# Patient Record
Sex: MALE | Race: BLACK OR AFRICAN AMERICAN | Employment: FULL TIME | URBAN - METROPOLITAN AREA
[De-identification: names, ages, dates, MRNs, and addresses within clinical notes are randomized per-mention and may not be internally consistent; named-entity substitution may affect disease eponyms.]

---

## 2017-06-11 ENCOUNTER — APPOINTMENT (OUTPATIENT)
Dept: GENERAL RADIOLOGY | Age: 52
End: 2017-06-11
Attending: EMERGENCY MEDICINE
Payer: SELF-PAY

## 2017-06-11 ENCOUNTER — HOSPITAL ENCOUNTER (EMERGENCY)
Age: 52
Discharge: HOME | End: 2017-06-11
Attending: EMERGENCY MEDICINE
Payer: SELF-PAY

## 2017-06-11 VITALS
DIASTOLIC BLOOD PRESSURE: 96 MMHG | RESPIRATION RATE: 18 BRPM | HEIGHT: 68 IN | OXYGEN SATURATION: 100 % | TEMPERATURE: 98 F | BODY MASS INDEX: 30.71 KG/M2 | HEART RATE: 66 BPM | SYSTOLIC BLOOD PRESSURE: 152 MMHG | WEIGHT: 202.6 LBS

## 2017-06-11 DIAGNOSIS — S59.902A ELBOW INJURY, LEFT, INITIAL ENCOUNTER: Primary | ICD-10-CM

## 2017-06-11 DIAGNOSIS — M25.422 ELBOW JOINT EFFUSION, LEFT: ICD-10-CM

## 2017-06-11 PROCEDURE — 99283 EMERGENCY DEPT VISIT LOW MDM: CPT

## 2017-06-11 PROCEDURE — 73080 X-RAY EXAM OF ELBOW: CPT

## 2017-06-11 PROCEDURE — A4565 SLINGS: HCPCS

## 2017-06-11 PROCEDURE — 74011250637 HC RX REV CODE- 250/637: Performed by: EMERGENCY MEDICINE

## 2017-06-11 PROCEDURE — L3670 SO ACRO/CLAV CAN WEB PRE OTS: HCPCS

## 2017-06-11 RX ORDER — HYDROCODONE BITARTRATE AND ACETAMINOPHEN 5; 325 MG/1; MG/1
1 TABLET ORAL
Status: COMPLETED | OUTPATIENT
Start: 2017-06-11 | End: 2017-06-11

## 2017-06-11 RX ORDER — ACETAMINOPHEN 325 MG/1
1000 TABLET ORAL 2 TIMES DAILY
COMMUNITY
End: 2017-12-18

## 2017-06-11 RX ORDER — HYDROCODONE BITARTRATE AND ACETAMINOPHEN 5; 325 MG/1; MG/1
1 TABLET ORAL
Qty: 20 TAB | Refills: 0 | Status: SHIPPED | OUTPATIENT
Start: 2017-06-11 | End: 2017-12-18

## 2017-06-11 RX ADMIN — HYDROCODONE BITARTRATE AND ACETAMINOPHEN 1 TABLET: 5; 325 TABLET ORAL at 05:01

## 2017-06-11 NOTE — ED PROVIDER NOTES
HPI Comments: Piedad Tejada is a 54 y.o. M with PMHx significant for HTN / Sepsis / Ulcers who presents ambulatory to ED [facility] ED c/o left forearm pain s/p falling backwards and bracing fall with left arm at work yesterday. Pt states that pain is worse with movement. He notes right hand dominance. Pt specifically denies any LOC, nausea or vomiting. There are no other complaints, changes, or physical findings at this time. The history is provided by the patient. Past Medical History:   Diagnosis Date    Gastrointestinal disorder     Hypertension     Neurological disorder     migraines    Other ill-defined conditions     Sepsis       Past Surgical History:   Procedure Laterality Date    HX HEENT      teeth pulled    HX OTHER SURGICAL      cyst head removed    NE EGD TRANSORAL BIOPSY SINGLE/MULTIPLE  1/9/2014              History reviewed. No pertinent family history. Social History     Social History    Marital status: SINGLE     Spouse name: N/A    Number of children: N/A    Years of education: N/A     Occupational History    Not on file. Social History Main Topics    Smoking status: Current Every Day Smoker     Packs/day: 1.00    Smokeless tobacco: Not on file    Alcohol use No    Drug use: No    Sexual activity: Not on file     Other Topics Concern    Not on file     Social History Narrative         ALLERGIES: Review of patient's allergies indicates no known allergies. Review of Systems   Constitutional: Negative. Negative for chills and fever. HENT: Negative. Negative for congestion, rhinorrhea and sinus pressure. Eyes: Negative. Negative for discharge and redness. Respiratory: Negative. Negative for chest tightness and shortness of breath. Cardiovascular: Negative. Negative for chest pain. Gastrointestinal: Negative. Negative for abdominal pain, blood in stool, nausea and vomiting. Endocrine: Negative. Genitourinary: Negative.   Negative for flank pain and hematuria. Musculoskeletal: Negative for back pain. Positive for left forearm pain   Skin: Negative. Negative for rash. Neurological: Negative. Negative for dizziness, seizures, syncope, weakness, numbness and headaches. Hematological: Negative. All other systems reviewed and are negative. Vitals:    06/11/17 0401 06/11/17 0449   BP: (!) 144/118 (!) 152/96   Pulse: 66    Resp: 18    Temp: 98 °F (36.7 °C)    SpO2: 100%    Weight: 91.9 kg (202 lb 9.6 oz)    Height: 5' 8\" (1.727 m)             Physical Exam   Constitutional: He is oriented to person, place, and time. He appears well-developed and well-nourished. No distress. HENT:   Head: Normocephalic and atraumatic. Nose: Nose normal.   Mouth/Throat: No oropharyngeal exudate. Eyes: Conjunctivae and EOM are normal. Pupils are equal, round, and reactive to light. No scleral icterus. Neck: Normal range of motion. Neck supple. No JVD present. No thyromegaly present. Cardiovascular: Normal rate, regular rhythm, normal heart sounds, intact distal pulses and normal pulses. PMI is not displaced. Exam reveals no gallop and no friction rub. No murmur heard. Pulmonary/Chest: Effort normal and breath sounds normal. No stridor. No respiratory distress. He has no decreased breath sounds. He has no wheezes. He has no rhonchi. He has no rales. He exhibits no tenderness. Abdominal: Soft. Normal aorta and bowel sounds are normal. He exhibits no distension, no abdominal bruit and no mass. There is no hepatosplenomegaly. There is no tenderness. There is no rebound, no guarding and no CVA tenderness. No hernia. Musculoskeletal:        Left shoulder: Normal.        Left elbow: He exhibits decreased range of motion. He exhibits no swelling, no effusion, no deformity and no laceration. Tenderness found. Olecranon process tenderness noted. No radial head, no medial epicondyle and no lateral epicondyle tenderness noted.         Left wrist: Normal. Neurological: He is alert and oriented to person, place, and time. He has normal reflexes. He displays no atrophy and no tremor. No cranial nerve deficit or sensory deficit. He exhibits normal muscle tone. He displays no seizure activity. Coordination normal. GCS eye subscore is 4. GCS verbal subscore is 5. GCS motor subscore is 6. Reflex Scores:       Patellar reflexes are 2+ on the right side and 2+ on the left side. Skin: Skin is warm. No rash noted. He is not diaphoretic. No erythema. Nursing note and vitals reviewed. MDM  Number of Diagnoses or Management Options  Elbow injury, left, initial encounter:   Elbow joint effusion, left:   Diagnosis management comments: DDx: Elbow fracture, Elbow dislocation, Elbow sprain. Impression/Plan: Pt fell while at work on an outstretched arm. Clinically has tenderness on olecranon but no obvious effusion. Neurovascularly intact. Will obtain x-ray to obtain final disposition. Amount and/or Complexity of Data Reviewed  Tests in the radiology section of CPT®: ordered and reviewed  Obtain history from someone other than the patient: yes  Review and summarize past medical records: yes  Independent visualization of images, tracings, or specimens: yes    Risk of Complications, Morbidity, and/or Mortality  Presenting problems: moderate  Diagnostic procedures: moderate  Management options: moderate    Patient Progress  Patient progress: stable    ED Course       Procedures    Patient Vitals for the past 12 hrs:   Temp Pulse Resp BP SpO2   06/11/17 0449 - - - (!) 152/96 -   06/11/17 0401 98 °F (36.7 °C) 66 18 (!) 144/118 100 %     IMAGING RESULTS:  XR ELBOW LT MIN 3 V   Final Result   EXAM: XR ELBOW LT MIN 3 V     INDICATION: left elbow pain. Injury at work.     COMPARISON: None.     FINDINGS: Three views of the left elbow demonstrate a joint effusion. . No  visible fracture line. Joint spaces are within normal limits.  Bone  mineralization is within normal limits.     IMPRESSION  IMPRESSION:      Joint effusion. No visible fracture line. Consider repeat in one week if there  is still pain. MEDICATIONS GIVEN:  Medications   HYDROcodone-acetaminophen (NORCO) 5-325 mg per tablet 1 Tab (1 Tab Oral Given 6/11/17 0501)       IMPRESSION:  1. Elbow injury, left, initial encounter    2. Elbow joint effusion, left        PLAN:  1. Discharge Medication List as of 6/11/2017  6:03 AM      START taking these medications    Details   HYDROcodone-acetaminophen (NORCO) 5-325 mg per tablet Take 1 Tab by mouth every four (4) hours as needed for Pain. Max Daily Amount: 6 Tabs., Print, Disp-20 Tab, R-0         CONTINUE these medications which have NOT CHANGED    Details   acetaminophen (TYLENOL) 325 mg tablet Take 1,000 mg by mouth two (2) times a day., Historical Med           2. Follow-up Information     Follow up With Details Comments [address] [facility], DO Call in 2 days  [address] [facility] [address] [phone]      [facility] EMERGENCY DEPT  If symptoms worsen [address] [place] [address]  [phone]        Return to ED if worse     DISCHARGE NOTE:  6:25 AM  The patient's results have been reviewed with family and/or caregiver. They verbally convey their understanding and agreement of the patient's signs, symptoms, diagnosis, treatment, and prognosis. They additionally agree to follow up as recommended in the discharge instructions or to return to the Emergency Room should the patient's condition change prior to their follow-up appointment. The family and/or caregiver verbally agrees with the care-plan and all of their questions have been answered. The discharge instructions have also been provided to the them along with educational information regarding the patient's diagnosis and a list of reasons why the patient would want to return to the ER prior to their follow-up appointment should their condition change.   Written by Jose D Gerber Evans Fuentes, ED Scribe, as dictated by Dede Mehta MD.        Attestations: This note is prepared by Lisa Villarreal, acting as Scribe for Dede Mehta MD.    Dede Mehta MD: The scribe's documentation has been prepared under my direction and personally reviewed by me in its entirety. I confirm that the note above accurately reflects all work, treatment, procedures, and medical decision making performed by me.

## 2017-06-11 NOTE — LETTER
[address] 
[facility] EMERGENCY DEPT 
[address] [place] [address] [address] [phone] 
[phone] Work/School Note Date: 6/11/2017 To Whom It May concern: 
 
Blaine Paz was seen and treated today in the emergency room by the following provider(s): 
Attending Provider: Denisse Gonzalez MD.   
 
Blaine Paz No work till 6/12/17, limited use left arm till 6/18/17 Sincerely, Denisse Gonzalez MD

## 2017-06-11 NOTE — ED NOTES
Dr. Bassam Joseph gave and reviewed discharge instructions with the patient. The patient verbalized understanding. The patient was given opportunity for questions. Patient discharged in stable condition to the waiting room with male visitor.

## 2017-06-11 NOTE — DISCHARGE INSTRUCTIONS
[employer] Activation    Thank you for requesting access to [employer]. Please follow the instructions below to securely access and download your online medical record. [employer] allows you to send messages to your doctor, view your test results, renew your prescriptions, schedule appointments, and more. How Do I Sign Up? 1. In your internet browser, go to [email or web address]  2. Click on the First Time User? Click Here link in the Sign In box. You will be redirect to the New Member Sign Up page. 3. Enter your [employer] Access Code exactly as it appears below. You will not need to use this code after youve completed the sign-up process. If you do not sign up before the expiration date, you must request a new code. [employer] Access Code: [email or web address]  Expires: 2017  3:45 AM (This is the date your [employer] access code will )    4. Enter the last four digits of your Social Security Number (xxxx) and Date of Birth (mm/dd/yyyy) as indicated and click Submit. You will be taken to the next sign-up page. 5. Create a [employer] ID. This will be your [employer] login ID and cannot be changed, so think of one that is secure and easy to remember. 6. Create a [employer] password. You can change your password at any time. 7. Enter your Password Reset Question and Answer. This can be used at a later time if you forget your password. 8. Enter your e-mail address. You will receive e-mail notification when new information is available in [address]. 9. Click Sign Up. You can now view and download portions of your medical record. 10. Click the Download Summary menu link to download a portable copy of your medical information. Additional Information    If you have questions, please visit the Frequently Asked Questions section of the [employer] website at [email or web address] Remember, [employer] is NOT to be used for urgent needs. For medical emergencies, dial 911.

## 2017-12-18 ENCOUNTER — HOSPITAL ENCOUNTER (EMERGENCY)
Age: 52
Discharge: HOME | End: 2017-12-18
Attending: EMERGENCY MEDICINE
Payer: SELF-PAY

## 2017-12-18 ENCOUNTER — APPOINTMENT (OUTPATIENT)
Dept: CT IMAGING | Age: 52
End: 2017-12-18
Attending: EMERGENCY MEDICINE
Payer: SELF-PAY

## 2017-12-18 ENCOUNTER — APPOINTMENT (OUTPATIENT)
Dept: GENERAL RADIOLOGY | Age: 52
End: 2017-12-18
Attending: EMERGENCY MEDICINE
Payer: SELF-PAY

## 2017-12-18 VITALS
BODY MASS INDEX: 29.84 KG/M2 | RESPIRATION RATE: 16 BRPM | TEMPERATURE: 98.3 F | HEIGHT: 68 IN | OXYGEN SATURATION: 93 % | DIASTOLIC BLOOD PRESSURE: 92 MMHG | SYSTOLIC BLOOD PRESSURE: 123 MMHG | HEART RATE: 82 BPM | WEIGHT: 196.87 LBS

## 2017-12-18 DIAGNOSIS — K85.90 ACUTE PANCREATITIS, UNSPECIFIED COMPLICATION STATUS, UNSPECIFIED PANCREATITIS TYPE: ICD-10-CM

## 2017-12-18 DIAGNOSIS — K22.6 MALLORY-WEISS TEAR: ICD-10-CM

## 2017-12-18 DIAGNOSIS — R55 FAINTING SPELL: ICD-10-CM

## 2017-12-18 DIAGNOSIS — R10.9 ACUTE ABDOMINAL PAIN: Primary | ICD-10-CM

## 2017-12-18 LAB
ABO + RH BLD: NORMAL
ALBUMIN SERPL-MCNC: 3.9 G/DL (ref 3.5–5)
ALBUMIN/GLOB SERPL: 1 {RATIO} (ref 1.1–2.2)
ALP SERPL-CCNC: 53 U/L (ref 45–117)
ALT SERPL-CCNC: 22 U/L (ref 12–78)
ANION GAP SERPL CALC-SCNC: 10 MMOL/L (ref 5–15)
APPEARANCE UR: CLEAR
APTT PPP: 29.1 SEC (ref 22.1–32.5)
AST SERPL-CCNC: 23 U/L (ref 15–37)
BASOPHILS # BLD: 0 K/UL (ref 0–0.1)
BASOPHILS NFR BLD: 0 % (ref 0–1)
BILIRUB SERPL-MCNC: 0.6 MG/DL (ref 0.2–1)
BILIRUB UR QL CFM: NEGATIVE
BLOOD GROUP ANTIBODIES SERPL: NORMAL
BUN SERPL-MCNC: 10 MG/DL (ref 6–20)
BUN/CREAT SERPL: 8 (ref 12–20)
CALCIUM SERPL-MCNC: 9 MG/DL (ref 8.5–10.1)
CHLORIDE SERPL-SCNC: 108 MMOL/L (ref 97–108)
CO2 SERPL-SCNC: 23 MMOL/L (ref 21–32)
COLOR UR: NORMAL
CREAT SERPL-MCNC: 1.19 MG/DL (ref 0.7–1.3)
EOSINOPHIL # BLD: 0.1 K/UL (ref 0–0.4)
EOSINOPHIL NFR BLD: 2 % (ref 0–7)
ERYTHROCYTE [DISTWIDTH] IN BLOOD BY AUTOMATED COUNT: 12.7 % (ref 11.5–14.5)
GLOBULIN SER CALC-MCNC: 3.9 G/DL (ref 2–4)
GLUCOSE SERPL-MCNC: 84 MG/DL (ref 65–100)
GLUCOSE UR STRIP.AUTO-MCNC: NEGATIVE MG/DL
HCT VFR BLD AUTO: 42.5 % (ref 36.6–50.3)
HEMOCCULT STL QL: NEGATIVE
HGB BLD-MCNC: 13.9 G/DL (ref 12.1–17)
HGB UR QL STRIP: NEGATIVE
INR PPP: 1 (ref 0.9–1.1)
KETONES UR QL STRIP.AUTO: NEGATIVE MG/DL
LACTATE SERPL-SCNC: 0.9 MMOL/L (ref 0.4–2)
LEUKOCYTE ESTERASE UR QL STRIP.AUTO: NEGATIVE
LIPASE SERPL-CCNC: 1141 U/L (ref 73–393)
LYMPHOCYTES # BLD: 2.4 K/UL (ref 0.8–3.5)
LYMPHOCYTES NFR BLD: 28 % (ref 12–49)
MAGNESIUM SERPL-MCNC: 1.8 MG/DL (ref 1.6–2.4)
MCH RBC QN AUTO: 30.7 PG (ref 26–34)
MCHC RBC AUTO-ENTMCNC: 32.7 G/DL (ref 30–36.5)
MCV RBC AUTO: 93.8 FL (ref 80–99)
MONOCYTES # BLD: 0.6 K/UL (ref 0–1)
MONOCYTES NFR BLD: 7 % (ref 5–13)
NEUTS SEG # BLD: 5.5 K/UL (ref 1.8–8)
NEUTS SEG NFR BLD: 63 % (ref 32–75)
NITRITE UR QL STRIP.AUTO: NEGATIVE
PH UR STRIP: 5.5 [PH] (ref 5–8)
PLATELET # BLD AUTO: 242 K/UL (ref 150–400)
POTASSIUM SERPL-SCNC: 4 MMOL/L (ref 3.5–5.1)
PROT SERPL-MCNC: 7.8 G/DL (ref 6.4–8.2)
PROT UR STRIP-MCNC: NEGATIVE MG/DL
PROTHROMBIN TIME: 10.5 SEC (ref 9–11.1)
RBC # BLD AUTO: 4.53 M/UL (ref 4.1–5.7)
SODIUM SERPL-SCNC: 141 MMOL/L (ref 136–145)
SP GR UR REFRACTOMETRY: 1.02 (ref 1–1.03)
SPECIMEN EXP DATE BLD: NORMAL
THERAPEUTIC RANGE,PTTT: NORMAL SECS (ref 58–77)
TROPONIN I SERPL-MCNC: <0.04 NG/ML
UROBILINOGEN UR QL STRIP.AUTO: 0.2 EU/DL (ref 0.2–1)
WBC # BLD AUTO: 8.7 K/UL (ref 4.1–11.1)

## 2017-12-18 PROCEDURE — 74011000250 HC RX REV CODE- 250: Performed by: EMERGENCY MEDICINE

## 2017-12-18 PROCEDURE — 96375 TX/PRO/DX INJ NEW DRUG ADDON: CPT

## 2017-12-18 PROCEDURE — 83605 ASSAY OF LACTIC ACID: CPT | Performed by: EMERGENCY MEDICINE

## 2017-12-18 PROCEDURE — 85025 COMPLETE CBC W/AUTO DIFF WBC: CPT | Performed by: EMERGENCY MEDICINE

## 2017-12-18 PROCEDURE — 85610 PROTHROMBIN TIME: CPT | Performed by: EMERGENCY MEDICINE

## 2017-12-18 PROCEDURE — 96361 HYDRATE IV INFUSION ADD-ON: CPT

## 2017-12-18 PROCEDURE — 82272 OCCULT BLD FECES 1-3 TESTS: CPT | Performed by: EMERGENCY MEDICINE

## 2017-12-18 PROCEDURE — 36415 COLL VENOUS BLD VENIPUNCTURE: CPT | Performed by: EMERGENCY MEDICINE

## 2017-12-18 PROCEDURE — 80053 COMPREHEN METABOLIC PANEL: CPT | Performed by: EMERGENCY MEDICINE

## 2017-12-18 PROCEDURE — 99284 EMERGENCY DEPT VISIT MOD MDM: CPT

## 2017-12-18 PROCEDURE — 93005 ELECTROCARDIOGRAM TRACING: CPT

## 2017-12-18 PROCEDURE — 74011636320 HC RX REV CODE- 636/320: Performed by: EMERGENCY MEDICINE

## 2017-12-18 PROCEDURE — C9113 INJ PANTOPRAZOLE SODIUM, VIA: HCPCS | Performed by: EMERGENCY MEDICINE

## 2017-12-18 PROCEDURE — 74011250636 HC RX REV CODE- 250/636: Performed by: EMERGENCY MEDICINE

## 2017-12-18 PROCEDURE — 86900 BLOOD TYPING SEROLOGIC ABO: CPT | Performed by: EMERGENCY MEDICINE

## 2017-12-18 PROCEDURE — 83735 ASSAY OF MAGNESIUM: CPT | Performed by: EMERGENCY MEDICINE

## 2017-12-18 PROCEDURE — 96374 THER/PROPH/DIAG INJ IV PUSH: CPT

## 2017-12-18 PROCEDURE — 85730 THROMBOPLASTIN TIME PARTIAL: CPT | Performed by: EMERGENCY MEDICINE

## 2017-12-18 PROCEDURE — 71010 XR CHEST PORT: CPT

## 2017-12-18 PROCEDURE — 83690 ASSAY OF LIPASE: CPT | Performed by: EMERGENCY MEDICINE

## 2017-12-18 PROCEDURE — 84484 ASSAY OF TROPONIN QUANT: CPT | Performed by: EMERGENCY MEDICINE

## 2017-12-18 PROCEDURE — 81003 URINALYSIS AUTO W/O SCOPE: CPT | Performed by: EMERGENCY MEDICINE

## 2017-12-18 PROCEDURE — 74177 CT ABD & PELVIS W/CONTRAST: CPT

## 2017-12-18 RX ORDER — OMEPRAZOLE 20 MG/1
20 TABLET, DELAYED RELEASE ORAL DAILY
Qty: 30 TAB | Refills: 0 | Status: SHIPPED | OUTPATIENT
Start: 2017-12-18

## 2017-12-18 RX ORDER — SODIUM CHLORIDE 9 MG/ML
50 INJECTION, SOLUTION INTRAVENOUS
Status: COMPLETED | OUTPATIENT
Start: 2017-12-18 | End: 2017-12-18

## 2017-12-18 RX ORDER — PROMETHAZINE HYDROCHLORIDE 25 MG/1
25 TABLET ORAL
Qty: 12 TAB | Refills: 0 | Status: SHIPPED | OUTPATIENT
Start: 2017-12-18 | End: 2019-01-13

## 2017-12-18 RX ORDER — ONDANSETRON 2 MG/ML
4 INJECTION INTRAMUSCULAR; INTRAVENOUS
Status: COMPLETED | OUTPATIENT
Start: 2017-12-18 | End: 2017-12-18

## 2017-12-18 RX ORDER — SODIUM CHLORIDE 0.9 % (FLUSH) 0.9 %
10 SYRINGE (ML) INJECTION
Status: COMPLETED | OUTPATIENT
Start: 2017-12-18 | End: 2017-12-18

## 2017-12-18 RX ORDER — HYDROCODONE BITARTRATE AND ACETAMINOPHEN 5; 325 MG/1; MG/1
1 TABLET ORAL
Qty: 20 TAB | Refills: 0 | Status: SHIPPED | OUTPATIENT
Start: 2017-12-18 | End: 2019-01-13

## 2017-12-18 RX ADMIN — SODIUM CHLORIDE 50 ML/HR: 900 INJECTION, SOLUTION INTRAVENOUS at 16:52

## 2017-12-18 RX ADMIN — SODIUM CHLORIDE 1000 ML: 900 INJECTION, SOLUTION INTRAVENOUS at 14:52

## 2017-12-18 RX ADMIN — SODIUM CHLORIDE 40 MG: 9 INJECTION INTRAMUSCULAR; INTRAVENOUS; SUBCUTANEOUS at 14:51

## 2017-12-18 RX ADMIN — Medication 10 ML: at 16:52

## 2017-12-18 RX ADMIN — ONDANSETRON HYDROCHLORIDE 4 MG: 2 INJECTION, SOLUTION INTRAMUSCULAR; INTRAVENOUS at 14:51

## 2017-12-18 RX ADMIN — IOPAMIDOL 100 ML: 755 INJECTION, SOLUTION INTRAVENOUS at 16:52

## 2017-12-18 NOTE — ED PROVIDER NOTES
EMERGENCY DEPARTMENT HISTORY AND PHYSICAL EXAM      Date: 12/18/2017  Patient Name: Nava Díaz    History of Presenting Illness     Chief Complaint   Patient presents with    Abdominal Pain     pt reports abdominal pain and vomiting x3 days    Vomiting       History Provided By: Patient    HPI: Nava Díaz, 50 y.o. male with PMHx significant for HTN and prior stomach ulcer, presents ambulatory with girlfriend to the ED [facility] ER ED with cc of constant abdominal pain x 4 days with associated diarrhea, nausea, and vomiting. Pt reports the abdominal pain has been present since onset of symptoms; initially it felt achy and cramping, then radiated to his genitals, and is now only present to his upper abdomen. He states symptoms began with abdominal pain and diarrhea with dark stool, which has ceased; following the diarrhea, pt has been nauseated and vomiting mucus. He states last night at work he had 1 episode of vomiting mucus with bloody streaks, felt dizzy, and fell; after resting, he went home. He denies observing clots in the vomit, and he has vomited mucus without blood since. Pt reports taking BC powder daily. He states he at a bleeding peptic ulcer 2 years ago, requiring a blood transfusion at [facility]; he is not followed by GI. Pt denies use of rx medications and hx of abdominal surgery, hepatitis, and pancreatitis. He specifically denies back pain, CP, and SOB. Social Hx: - Tobacco, + EtOH (occ), - Illicit Drugs    PCP: None    There are no other complaints, changes, or physical findings at this time. Current Outpatient Prescriptions   Medication Sig Dispense Refill    HYDROcodone-acetaminophen (NORCO) 5-325 mg per tablet Take 1 Tab by mouth every four (4) hours as needed for Pain. Max Daily Amount: 6 Tabs. 20 Tab 0    promethazine (PHENERGAN) 25 mg tablet Take 1 Tab by mouth every six (6) hours as needed. 12 Tab 0    omeprazole (PRILOSEC OTC) 20 mg tablet Take 20 mg by mouth daily.  30 Tab 0       Past History     Past Medical History:  Past Medical History:   Diagnosis Date    Gastrointestinal disorder     Hypertension     Neurological disorder     migraines    Other ill-defined conditions     Sepsis       Past Surgical History:  Past Surgical History:   Procedure Laterality Date    HX HEENT      teeth pulled    HX OTHER SURGICAL      cyst head removed    [facility] EGD TRANSORAL BIOPSY SINGLE/MULTIPLE  1/9/2014            Family History:  No family history on file. Social History:  Social History   Substance Use Topics    Smoking status: Current Every Day Smoker     Packs/day: 1.00    Smokeless tobacco: Not on file    Alcohol use No       Allergies:  No Known Allergies      Review of Systems   Review of Systems   Constitutional: Negative. Negative for chills and fever. HENT: Negative for rhinorrhea and sinus pressure. Eyes: Negative. Negative for discharge and redness. Respiratory: Negative. Negative for chest tightness and shortness of breath. Cardiovascular: Negative. Negative for chest pain. Gastrointestinal: Positive for abdominal pain, nausea and vomiting (1 episode with bloody streaks last night; several episodes of mucus). Negative for blood in stool. Endocrine: Negative. Genitourinary: Negative. Negative for flank pain and hematuria. Musculoskeletal: Negative. Negative for back pain. Skin: Negative. Negative for rash. Neurological: Negative. Negative for dizziness, seizures, weakness, numbness and headaches. Hematological: Negative. All other systems reviewed and are negative. Physical Exam   Physical Exam   Constitutional: He is oriented to person, place, and time. He appears well-developed and well-nourished. No distress. HENT:   Head: Normocephalic and atraumatic. Nose: Nose normal.   Mouth/Throat: No oropharyngeal exudate. Eyes: Conjunctivae and EOM are normal. Pupils are equal, round, and reactive to light. No scleral icterus.    Neck: Normal range of motion. Neck supple. No JVD present. No thyromegaly present. Cardiovascular: Normal rate, regular rhythm, normal heart sounds, intact distal pulses and normal pulses. PMI is not displaced. Exam reveals no gallop and no friction rub. No murmur heard. Pulmonary/Chest: Effort normal and breath sounds normal. No stridor. No respiratory distress. He has no decreased breath sounds. He has no wheezes. He has no rhonchi. He has no rales. He exhibits no tenderness. Abdominal: Soft. Normal aorta and bowel sounds are normal. He exhibits no distension, no abdominal bruit and no mass. There is no hepatosplenomegaly. There is tenderness in the right upper quadrant and epigastric area. There is no rebound, no guarding and no CVA tenderness. No hernia. Genitourinary: Rectal exam shows no external hemorrhoid, no internal hemorrhoid, no fissure, no mass, no tenderness and guaiac negative stool. Musculoskeletal: Normal range of motion. Neurological: He is alert and oriented to person, place, and time. He has normal reflexes. He displays no atrophy and no tremor. No cranial nerve deficit or sensory deficit. He exhibits normal muscle tone. He displays no seizure activity. Coordination normal. GCS eye subscore is 4. GCS verbal subscore is 5. GCS motor subscore is 6. Reflex Scores:       Patellar reflexes are 2+ on the right side and 2+ on the left side. Skin: Skin is warm. No rash noted. He is not diaphoretic. No erythema. No pallor. Nursing note and vitals reviewed.         Diagnostic Study Results     Labs -     Recent Results (from the past 12 hour(s))   CBC WITH AUTOMATED DIFF    Collection Time: 12/18/17  2:28 PM   Result Value Ref Range    WBC 8.7 4.1 - 11.1 K/uL    RBC 4.53 4.10 - 5.70 M/uL    HGB 13.9 12.1 - 17.0 g/dL    HCT 42.5 36.6 - 50.3 %    MCV 93.8 80.0 - 99.0 FL    MCH 30.7 26.0 - 34.0 PG    MCHC 32.7 30.0 - 36.5 g/dL    RDW 12.7 11.5 - 14.5 %    PLATELET 092 751 - 925 K/uL    NEUTROPHILS 63 32 - 75 %    LYMPHOCYTES 28 12 - 49 %    MONOCYTES 7 5 - 13 %    EOSINOPHILS 2 0 - 7 %    BASOPHILS 0 0 - 1 %    ABS. NEUTROPHILS 5.5 1.8 - 8.0 K/UL    ABS. LYMPHOCYTES 2.4 0.8 - 3.5 K/UL    ABS. MONOCYTES 0.6 0.0 - 1.0 K/UL    ABS. EOSINOPHILS 0.1 0.0 - 0.4 K/UL    ABS. BASOPHILS 0.0 0.0 - 0.1 K/UL   METABOLIC PANEL, COMPREHENSIVE    Collection Time: 12/18/17  2:28 PM   Result Value Ref Range    Sodium 141 136 - 145 mmol/L    Potassium 4.0 3.5 - 5.1 mmol/L    Chloride 108 97 - 108 mmol/L    CO2 23 21 - 32 mmol/L    Anion gap 10 5 - 15 mmol/L    Glucose 84 65 - 100 mg/dL    BUN 10 6 - 20 MG/DL    Creatinine 1.19 0.70 - 1.30 MG/DL    BUN/Creatinine ratio 8 (L) 12 - 20      GFR est AA >60 >60 ml/min/1.73m2    GFR est non-AA >60 >60 ml/min/1.73m2    Calcium 9.0 8.5 - 10.1 MG/DL    Bilirubin, total 0.6 0.2 - 1.0 MG/DL    ALT (SGPT) 22 12 - 78 U/L    AST (SGOT) 23 15 - 37 U/L    Alk.  phosphatase 53 45 - 117 U/L    Protein, total 7.8 6.4 - 8.2 g/dL    Albumin 3.9 3.5 - 5.0 g/dL    Globulin 3.9 2.0 - 4.0 g/dL    A-G Ratio 1.0 (L) 1.1 - 2.2     LIPASE    Collection Time: 12/18/17  2:28 PM   Result Value Ref Range    Lipase 1141 (H) 73 - 393 U/L   TROPONIN I    Collection Time: 12/18/17  2:28 PM   Result Value Ref Range    Troponin-I, Qt. <0.04 <0.05 ng/mL   MAGNESIUM    Collection Time: 12/18/17  2:28 PM   Result Value Ref Range    Magnesium 1.8 1.6 - 2.4 mg/dL   PTT    Collection Time: 12/18/17  2:50 PM   Result Value Ref Range    aPTT 29.1 22.1 - 32.5 sec    aPTT, therapeutic range     58.0 - 77.0 SECS   PROTHROMBIN TIME + INR    Collection Time: 12/18/17  2:50 PM   Result Value Ref Range    INR 1.0 0.9 - 1.1      Prothrombin time 10.5 9.0 - 11.1 sec   LACTIC ACID    Collection Time: 12/18/17  2:50 PM   Result Value Ref Range    Lactic acid 0.9 0.4 - 2.0 MMOL/L   TYPE & SCREEN    Collection Time: 12/18/17  2:50 PM   Result Value Ref Range    Crossmatch Expiration 12/21/2017     ABO/Rh(D) A POSITIVE     Antibody screen NEG URINALYSIS W/ RFLX MICROSCOPIC    Collection Time: 12/18/17  3:52 PM   Result Value Ref Range    Color YELLOW/STRAW      Appearance CLEAR CLEAR      Specific gravity 1.024 1.003 - 1.030      pH (UA) 5.5 5.0 - 8.0      Protein NEGATIVE  NEG mg/dL    Glucose NEGATIVE  NEG mg/dL    Ketone NEGATIVE  NEG mg/dL    Blood NEGATIVE  NEG      Urobilinogen 0.2 0.2 - 1.0 EU/dL    Nitrites NEGATIVE  NEG      Leukocyte Esterase NEGATIVE  NEG     BILIRUBIN, CONFIRM    Collection Time: 12/18/17  3:52 PM   Result Value Ref Range    Bilirubin UA, confirm NEGATIVE  NEG     OCCULT BLOOD, STOOL    Collection Time: 12/18/17  4:33 PM   Result Value Ref Range    Occult blood, stool NEGATIVE  NEG         Radiologic Studies -   CT Results  (Last 48 hours)               12/18/17 1652  CT ABD PELV W CONT Final result    Impression:  IMPRESSION: No bowel obstruction, ileus or perforation. No intra-abdominal   abscess. Narrative:  INDICATION: Abdominal pain, vomiting for 3 days. CT of the abdomen and pelvis is performed with 5 mm collimation. Study is   performed with 100 cc of nonionic Isovue 370. Sagittal and coronal reformatted   images were also performed. CT dose reduction was achieved with the use of the standardized protocol   tailored for this examination and automatic exposure control for dose   modulation. Direct comparison is made to prior CT dated January 2014. Findings:       Lung bases: There is minimal right lower lobe atelectasis. Liver: There is diffuse study infiltration of the liver. Adrenals: Adrenal glands are normal.       Pancreas: The pancreas is normal.       Gallbladder: The gallbladder is normal.       Kidneys: The kidneys are normal.       Spleen: The spleen is normal.       Lymph nodes. There is no gabriela hepatitis, mesenteric, retroperitoneal or pelvic   lymphadenopathy. Bowel: No thickened or dilated loop of large or small bowel is visualized.    Scattered colonic diverticulosis is noted; there is no diverticulitis. Appendix: The appendix is normal.       Urinary bladder: Urinary bladder is partially filled and grossly normal.       Miscellaneous: There is no free intraperitoneal fluid or gas. There is no focal   fluid collection to suggest abscess. CXR Results  (Last 48 hours)               12/18/17 1525  XR CHEST PORT Final result    Impression:  IMPRESSION: No acute cardiopulmonary disease. Narrative:  INDICATION: Near syncope. vomiting. Portable AP upright view of the chest.       There is no prior study for direct comparison. Cardiomediastinal silhouette is within normal limits. Lungs are clear   bilaterally. Pleural spaces are normal. Osseous structures are intact. Medical Decision Making   I am the first provider for this patient. I reviewed the vital signs, available nursing notes, past medical history, past surgical history, family history and social history. Vital Signs-Reviewed the patient's vital signs. Patient Vitals for the past 12 hrs:   Temp Pulse Resp BP SpO2   12/18/17 1642 - - - - 94 %   12/18/17 1630 - - - 113/65 95 %   12/18/17 1615 - - - 116/90 96 %   12/18/17 1551 - - - 119/76 95 %   12/18/17 1545 - - - 107/66 96 %   12/18/17 1421 - - - - 91 %   12/18/17 1411 - - - (!) 145/98 -   12/18/17 1347 98.3 °F (36.8 °C) 82 16 (!) 137/99 97 %       Pulse Oximetry Analysis - 97% on RA    Cardiac Monitor:   Rate: 82 bpm    EKG interpretation: (Preliminary) 17:34  Rhythm: sinus bradycardia; and regular . Rate (approx.): 53; Axis: normal; MD interval: normal; QRS interval: normal ; ST/T wave: normal.      Records Reviewed: Nursing Notes and Old Medical Records    Provider Notes (Medical Decision Making):   DDx: GI bleed, PUD, Merry-Mccoy tear, hepatitis, pancreatitis, gallstones, symptomatic anemia, arrhythmia, seizure, coronary syndrome, vasovagal syncope.      Impression/plan: Pt presents with diarrhea, epigastric abdominal pain, followed by vomiting of mucus. Last evening at work vomited mucus with blood streaks and apparently had a fainting episode and was sent home from work. Girlfriend insisted he come to the hospital today. Pt states he had a GI bleed 2 years ago related to a peptic ulcer. He does admit to excessive use of BC powders. Plan will be to check labs, hemoccult, and make final disposition pending those results. May need admission for endoscopy. ED Course:   Initial assessment performed. The patients presenting problems have been discussed, and they are in agreement with the care plan formulated and outlined with them. I have encouraged them to ask questions as they arise throughout their visit. PROGRESS NOTE:  5:26 PM  He has no further pain. Discussed results including a normal CT with him and his girlfriend. He prefers to be discharged with close follow up to GI. Talked to him about avoiding any alcohol or nonsteroidals. He will return if he has any recurrent pain, bleeding, vomiting. Disposition:  DISCHARGE NOTE  5:29 PM  The patient has been re-evaluated and is ready for discharge. Reviewed available results with patient. Counseled patient on diagnosis and care plan. Patient has expressed understanding, and all questions have been answered. Patient agrees with plan and agrees to follow up as recommended, or return to the ED if their symptoms worsen. Discharge instructions have been provided and explained to the patient, along with reasons to return to the ED. PLAN:  1. Current Discharge Medication List      START taking these medications    Details   promethazine (PHENERGAN) 25 mg tablet Take 1 Tab by mouth every six (6) hours as needed. Qty: 12 Tab, Refills: 0      omeprazole (PRILOSEC OTC) 20 mg tablet Take 20 mg by mouth daily.   Qty: 30 Tab, Refills: 0         CONTINUE these medications which have CHANGED    Details   HYDROcodone-acetaminophen (NORCO) 5-325 mg per tablet Take 1 Tab by mouth every four (4) hours as needed for Pain. Max Daily Amount: 6 Tabs. Qty: 20 Tab, Refills: 0         STOP taking these medications       acetaminophen (TYLENOL) 325 mg tablet Comments:   Reason for Stoppin.   Follow-up Information     Follow up With Details Comments Contact Info    Petty Sandoval MD Schedule an appointment as soon as possible for a visit in 1 day  Gladys Ramos [address] [place] [address]  [phone]      [facility] EMERGENCY DEPT  If symptoms worsen [address] [place] [address]  [phone]        Return to ED if worse     Diagnosis     Clinical Impression:   1. Acute abdominal pain    2. Acute pancreatitis, unspecified complication status, unspecified pancreatitis type    3. Merry-Mccoy tear    4. Fainting spell        Attestations: This note is prepared by Keri Hawkins, acting as Scribe for Odalys Santillan MD.      The scribe's documentation has been prepared under my direction and personally reviewed by me in its entirety. I confirm that the note above accurately reflects all work, treatment, procedures, and medical decision making performed by me.   Odalys Santillan MD

## 2017-12-18 NOTE — DISCHARGE INSTRUCTIONS
Abdominal Pain: Care Instructions  Your Care Instructions    Abdominal pain has many possible causes. Some aren't serious and get better on their own in a few days. Others need more testing and treatment. If your pain continues or gets worse, you need to be rechecked and may need more tests to find out what is wrong. You may need surgery to correct the problem. Don't ignore new symptoms, such as fever, nausea and vomiting, urination problems, pain that gets worse, and dizziness. These may be signs of a more serious problem. Your doctor may have recommended a follow-up visit in the next 8 to 12 hours. If you are not getting better, you may need more tests or treatment. The doctor has checked you carefully, but problems can develop later. If you notice any problems or new symptoms, get medical treatment right away. Follow-up care is a key part of your treatment and safety. Be sure to make and go to all appointments, and call your doctor if you are having problems. It's also a good idea to know your test results and keep a list of the medicines you take. How can you care for yourself at home? · Rest until you feel better. · To prevent dehydration, drink plenty of fluids, enough so that your urine is light yellow or clear like water. Choose water and other caffeine-free clear liquids until you feel better. If you have kidney, heart, or liver disease and have to limit fluids, talk with your doctor before you increase the amount of fluids you drink. · If your stomach is upset, eat mild foods, such as rice, dry toast or crackers, bananas, and applesauce. Try eating several small meals instead of two or three large ones. · Wait until 48 hours after all symptoms have gone away before you have spicy foods, alcohol, and drinks that contain caffeine. · Do not eat foods that are high in fat. · Avoid anti-inflammatory medicines such as aspirin, ibuprofen (Advil, Motrin), and naproxen (Aleve).  These can cause stomach upset. Talk to your doctor if you take daily aspirin for another health problem. When should you call for help? Call 911 anytime you think you may need emergency care. For example, call if:  ? · You passed out (lost consciousness). ? · You pass maroon or very bloody stools. ? · You vomit blood or what looks like coffee grounds. ? · You have new, severe belly pain. ?Call your doctor now or seek immediate medical care if:  ? · Your pain gets worse, especially if it becomes focused in one area of your belly. ? · You have a new or higher fever. ? · Your stools are black and look like tar, or they have streaks of blood. ? · You have unexpected vaginal bleeding. ? · You have symptoms of a urinary tract infection. These may include:  ¨ Pain when you urinate. ¨ Urinating more often than usual.  ¨ Blood in your urine. ? · You are dizzy or lightheaded, or you feel like you may faint. ? Watch closely for changes in your health, and be sure to contact your doctor if:  ? · You are not getting better after 1 day (24 hours). Where can you learn more? Go to http://karlyPhoenix New Mediajulieth.info/. Enter M801 in the search box to learn more about \"Abdominal Pain: Care Instructions. \"  Current as of: March 20, 2017  Content Version: 11.4  © 8275-9018 [employer]. Care instructions adapted under license by [employer] (which disclaims liability or warranty for this information). If you have questions about a medical condition or this instruction, always ask your healthcare professional. [place] [address] any warranty or liability for your use of this information. Fainting: Care Instructions  Your Care Instructions    When you faint, or pass out, you lose consciousness for a short time. A brief drop in blood flow to the brain often causes it. When you fall or lie down, more blood flows to your brain and you regain consciousness.   Emotional stress, pain, or overheating-especially if you have been standing-can make you faint. In these cases, fainting is usually not serious. But fainting can be a sign of a more serious problem. Your doctor may want you to have more tests to rule out other causes. The treatment you need depends on the reason why you fainted. The doctor has checked you carefully, but problems can develop later. If you notice any problems or new symptoms, get medical treatment right away. Follow-up care is a key part of your treatment and safety. Be sure to make and go to all appointments, and call your doctor if you are having problems. It's also a good idea to know your test results and keep a list of the medicines you take. How can you care for yourself at home? · Drink plenty of fluids to prevent dehydration. If you have kidney, heart, or liver disease and have to limit fluids, talk with your doctor before you increase your fluid intake. When should you call for help? Call 911 anytime you think you may need emergency care. For example, call if:  ? · You have symptoms of a heart problem. These may include:  ¨ Chest pain or pressure. ¨ Severe trouble breathing. ¨ A fast or irregular heartbeat. ¨ Lightheadedness or sudden weakness. ¨ Coughing up pink, foamy mucus. ¨ Passing out. After you call 911, the  may tell you to chew 1 adult-strength or 2 to 4 low-dose aspirin. Wait for an ambulance. Do not try to drive yourself. ? · You have symptoms of a stroke. These may include:  ¨ Sudden numbness, tingling, weakness, or loss of movement in your face, arm, or leg, especially on only one side of your body. ¨ Sudden vision changes. ¨ Sudden trouble speaking. ¨ Sudden confusion or trouble understanding simple statements. ¨ Sudden problems with walking or balance. ¨ A sudden, severe headache that is different from past headaches. ? · You passed out (lost consciousness) again. ? Watch closely for changes in your health, and be sure to contact your doctor if:  ? · You do not get better as expected. Where can you learn more? Go to http://karly-julieth.info/. Enter T385 in the search box to learn more about \"Fainting: Care Instructions. \"  Current as of: March 20, 2017  Content Version: 11.4  © 4606-9615 [employer]. Care instructions adapted under license by [employer] (which disclaims liability or warranty for this information). If you have questions about a medical condition or this instruction, always ask your healthcare professional. [address] any warranty or liability for your use of this information. Learning About a Merry-Mccoy Tear  What is a Merry-Mccoy tear? A Merry-Mccoy tear is a rip or tear of a mucous membrane in the digestive tract. Mucous membranes are tissues that line body cavities or canals. They make a thick, slippery liquid called mucus. The mucus protects the membranes and keeps them moist.  This kind of tear is most often caused by severe vomiting. It usually happens where the lower esophagus and the stomach meet. (The esophagus is the tube that connects the throat to the stomach.)  How is a Merry-Mccoy tear diagnosed? You will have a physical exam by your doctor. You may also have blood tests. And you might have an endoscopy. This is a test that uses a thin, flexible, lighted viewing tool (endoscope) to allow a doctor to see the insides of organs, canals, and cavities in the body. What are the symptoms? Symptoms may include:  · Belly pain. · Vomiting blood. · Dark stools that look like tar or that contain dark red blood. How is a Merry-Mccoy tear treated? In most cases, the tear heals on its own in a few days. You may not need treatment. If you do need treatment, you may have to stay in the hospital. Treatment may include:  · Intravenous (IV) fluids to help prevent dehydration.   · Medicines that block stomach acid.  · Repair through the endoscope with a special clip (endoclip) to stop the bleeding. · A blood transfusion to replace heavy blood loss. · Surgery (only in severe cases). Follow-up care is a key part of your treatment and safety. Be sure to make and go to all appointments, and call your doctor if you are having problems. It's also a good idea to know your test results and keep a list of the medicines you take. Where can you learn more? Go to http://akrly-julieth.info/. Enter B410 in the search box to learn more about \"Learning About a Merry-Mccoy Tear. \"  Current as of: May 12, 2017  Content Version: 11.4  © 6695-2228 [facility]. Care instructions adapted under license by [employer] (which disclaims liability or warranty for this information). If you have questions about a medical condition or this instruction, always ask your healthcare professional. [place] [address] any warranty or liability for your use of this information. Learning About a Merry-Mccoy Tear  What is a Merry-Mccoy tear? A Merry-Mccoy tear is a rip or tear of a mucous membrane in the digestive tract. Mucous membranes are tissues that line body cavities or canals. They make a thick, slippery liquid called mucus. The mucus protects the membranes and keeps them moist.  This kind of tear is most often caused by severe vomiting. It usually happens where the lower esophagus and the stomach meet. (The esophagus is the tube that connects the throat to the stomach.)  How is a Merry-Mccoy tear diagnosed? You will have a physical exam by your doctor. You may also have blood tests. And you might have an endoscopy. This is a test that uses a thin, flexible, lighted viewing tool (endoscope) to allow a doctor to see the insides of organs, canals, and cavities in the body. What are the symptoms? Symptoms may include:  · Belly pain. · Vomiting blood.   · Dark stools that look like tar or that contain dark red blood. How is a Merry-Mccoy tear treated? In most cases, the tear heals on its own in a few days. You may not need treatment. If you do need treatment, you may have to stay in the hospital. Treatment may include:  · Intravenous (IV) fluids to help prevent dehydration. · Medicines that block stomach acid. · Repair through the endoscope with a special clip (endoclip) to stop the bleeding. · A blood transfusion to replace heavy blood loss. · Surgery (only in severe cases). Follow-up care is a key part of your treatment and safety. Be sure to make and go to all appointments, and call your doctor if you are having problems. It's also a good idea to know your test results and keep a list of the medicines you take. Where can you learn more? Go to http://karly-julieth.info/. Enter B410 in the search box to learn more about \"Learning About a Merry-Mccoy Tear. \"  Current as of: May 12, 2017  Content Version: 11.4  © 7329-3009 [employer]. Care instructions adapted under license by [employer] (which disclaims liability or warranty for this information). If you have questions about a medical condition or this instruction, always ask your healthcare professional. [address] any warranty or liability for your use of this information. SpiderOak Activation    Thank you for requesting access to SpiderOak. Please follow the instructions below to securely access and download your online medical record. SpiderOak allows you to send messages to your doctor, view your test results, renew your prescriptions, schedule appointments, and more. How Do I Sign Up? 1. In your internet browser, go to [email or web address]  2. Click on the First Time User? Click Here link in the Sign In box. You will be redirect to the New Member Sign Up page. 3. Enter your SpiderOak Access Code exactly as it appears below.  You will not need to use this code after youve completed the sign-up process. If you do not sign up before the expiration date, you must request a new code. ev-social Access Code: [email or web address]  Expires: 3/18/2018  5:29 PM (This is the date your ev-social access code will )    4. Enter the last four digits of your Social Security Number (xxxx) and Date of Birth (mm/dd/yyyy) as indicated and click Submit. You will be taken to the next sign-up page. 5. Create a JumpOffCampust ID. This will be your ev-social login ID and cannot be changed, so think of one that is secure and easy to remember. 6. Create a ev-social password. You can change your password at any time. 7. Enter your Password Reset Question and Answer. This can be used at a later time if you forget your password. 8. Enter your e-mail address. You will receive e-mail notification when new information is available in [address]. 9. Click Sign Up. You can now view and download portions of your medical record. 10. Click the Download Summary menu link to download a portable copy of your medical information. Additional Information    If you have questions, please visit the Frequently Asked Questions section of the ev-social website at [email or web address] Remember, ev-social is NOT to be used for urgent needs. For medical emergencies, dial 911.

## 2017-12-18 NOTE — ED NOTES
MD Aden Arm reviewed discharge instructions with the patient. The patient verbalized understanding. Pt is alert,oriented, and ready for discharge with significant other.

## 2017-12-18 NOTE — LETTER
[address] 
[facility] EMERGENCY DEPT 
[address] [id number] 
[phone] Work/School Note Date: 12/18/2017 To Whom It May concern: 
 
Jay Aponte was seen and treated today in the emergency room by the following provider(s): 
Attending Provider: Janiya Price MD.   
 
Thompson Fadi No work till 12/20/17 Sincerely, Janiya Price MD

## 2017-12-18 NOTE — ED NOTES
Assumed care of pt from triage. Pt resting in bed guarding abdomen. Center of abdomen pain 9/10, smoking makes the pain worse. Has not taken any medication for this. Pt reports hx of stomach ulcers approx 2 years. Denies having a GI MD or PCP. Pt has vomiting since Thursday. Yesterday at work he was vomiting blood, pt unsure what color. Pt reports while he was at work he became dizzy and fell and broke his glasses. Denies dizziness or chest pain or SOB at time. Pt hasn't had a BM in 2 days. 3 days ago he had diarrhea all day. Pt has not been able to keep anything down since Thursday.

## 2017-12-19 LAB
ATRIAL RATE: 53 BPM
CALCULATED P AXIS, ECG09: 39 DEGREES
CALCULATED R AXIS, ECG10: 49 DEGREES
CALCULATED T AXIS, ECG11: 41 DEGREES
DIAGNOSIS, 93000: NORMAL
P-R INTERVAL, ECG05: 182 MS
Q-T INTERVAL, ECG07: 448 MS
QRS DURATION, ECG06: 114 MS
QTC CALCULATION (BEZET), ECG08: 420 MS
VENTRICULAR RATE, ECG03: 53 BPM

## 2018-07-09 PROCEDURE — 96374 THER/PROPH/DIAG INJ IV PUSH: CPT

## 2018-07-09 PROCEDURE — 96375 TX/PRO/DX INJ NEW DRUG ADDON: CPT

## 2018-07-09 PROCEDURE — 99284 EMERGENCY DEPT VISIT MOD MDM: CPT

## 2018-07-09 PROCEDURE — 93005 ELECTROCARDIOGRAM TRACING: CPT

## 2018-07-10 ENCOUNTER — APPOINTMENT (OUTPATIENT)
Dept: GENERAL RADIOLOGY | Age: 53
End: 2018-07-10
Attending: EMERGENCY MEDICINE
Payer: SELF-PAY

## 2018-07-10 ENCOUNTER — HOSPITAL ENCOUNTER (EMERGENCY)
Age: 53
Discharge: HOME | End: 2018-07-10
Attending: EMERGENCY MEDICINE
Payer: SELF-PAY

## 2018-07-10 VITALS
HEART RATE: 70 BPM | OXYGEN SATURATION: 96 % | SYSTOLIC BLOOD PRESSURE: 160 MMHG | BODY MASS INDEX: 31.88 KG/M2 | TEMPERATURE: 97.8 F | RESPIRATION RATE: 14 BRPM | DIASTOLIC BLOOD PRESSURE: 89 MMHG | HEIGHT: 68 IN | WEIGHT: 210.32 LBS

## 2018-07-10 DIAGNOSIS — I10 ACCELERATED HYPERTENSION: ICD-10-CM

## 2018-07-10 DIAGNOSIS — G44.209 TENSION-TYPE HEADACHE, NOT INTRACTABLE, UNSPECIFIED CHRONICITY PATTERN: Primary | ICD-10-CM

## 2018-07-10 DIAGNOSIS — R42 DIZZINESS: ICD-10-CM

## 2018-07-10 DIAGNOSIS — R10.13 ABDOMINAL PAIN, EPIGASTRIC: ICD-10-CM

## 2018-07-10 DIAGNOSIS — R11.2 NAUSEA AND VOMITING, INTRACTABILITY OF VOMITING NOT SPECIFIED, UNSPECIFIED VOMITING TYPE: ICD-10-CM

## 2018-07-10 LAB
ALBUMIN SERPL-MCNC: 4.2 G/DL (ref 3.5–5)
ALBUMIN/GLOB SERPL: 1 {RATIO} (ref 1.1–2.2)
ALP SERPL-CCNC: 52 U/L (ref 45–117)
ALT SERPL-CCNC: 23 U/L (ref 12–78)
ANION GAP SERPL CALC-SCNC: 8 MMOL/L (ref 5–15)
AST SERPL-CCNC: 13 U/L (ref 15–37)
BASOPHILS # BLD: 0 K/UL (ref 0–0.1)
BASOPHILS NFR BLD: 0 % (ref 0–1)
BILIRUB SERPL-MCNC: 0.5 MG/DL (ref 0.2–1)
BUN SERPL-MCNC: 12 MG/DL (ref 6–20)
BUN/CREAT SERPL: 9 (ref 12–20)
CALCIUM SERPL-MCNC: 9.6 MG/DL (ref 8.5–10.1)
CHLORIDE SERPL-SCNC: 102 MMOL/L (ref 97–108)
CK MB CFR SERPL CALC: NORMAL % (ref 0–2.5)
CK MB SERPL-MCNC: <1 NG/ML (ref 5–25)
CK SERPL-CCNC: 126 U/L (ref 39–308)
CO2 SERPL-SCNC: 28 MMOL/L (ref 21–32)
CREAT SERPL-MCNC: 1.28 MG/DL (ref 0.7–1.3)
DIFFERENTIAL METHOD BLD: NORMAL
EOSINOPHIL # BLD: 0.1 K/UL (ref 0–0.4)
EOSINOPHIL NFR BLD: 1 % (ref 0–7)
ERYTHROCYTE [DISTWIDTH] IN BLOOD BY AUTOMATED COUNT: 12.1 % (ref 11.5–14.5)
GLOBULIN SER CALC-MCNC: 4.1 G/DL (ref 2–4)
GLUCOSE SERPL-MCNC: 94 MG/DL (ref 65–100)
HCT VFR BLD AUTO: 47.7 % (ref 36.6–50.3)
HGB BLD-MCNC: 15.2 G/DL (ref 12.1–17)
IMM GRANULOCYTES # BLD: 0 K/UL (ref 0–0.04)
IMM GRANULOCYTES NFR BLD AUTO: 0 % (ref 0–0.5)
LIPASE SERPL-CCNC: 324 U/L (ref 73–393)
LYMPHOCYTES # BLD: 2.4 K/UL (ref 0.8–3.5)
LYMPHOCYTES NFR BLD: 24 % (ref 12–49)
MCH RBC QN AUTO: 31.1 PG (ref 26–34)
MCHC RBC AUTO-ENTMCNC: 31.9 G/DL (ref 30–36.5)
MCV RBC AUTO: 97.7 FL (ref 80–99)
MONOCYTES # BLD: 0.6 K/UL (ref 0–1)
MONOCYTES NFR BLD: 6 % (ref 5–13)
NEUTS SEG # BLD: 7 K/UL (ref 1.8–8)
NEUTS SEG NFR BLD: 69 % (ref 32–75)
NRBC # BLD: 0 K/UL (ref 0–0.01)
NRBC BLD-RTO: 0 PER 100 WBC
PLATELET # BLD AUTO: 257 K/UL (ref 150–400)
PMV BLD AUTO: 9.3 FL (ref 8.9–12.9)
POTASSIUM SERPL-SCNC: 3.8 MMOL/L (ref 3.5–5.1)
PROT SERPL-MCNC: 8.3 G/DL (ref 6.4–8.2)
RBC # BLD AUTO: 4.88 M/UL (ref 4.1–5.7)
SODIUM SERPL-SCNC: 138 MMOL/L (ref 136–145)
TROPONIN I SERPL-MCNC: <0.05 NG/ML
WBC # BLD AUTO: 10.2 K/UL (ref 4.1–11.1)

## 2018-07-10 PROCEDURE — 84484 ASSAY OF TROPONIN QUANT: CPT | Performed by: EMERGENCY MEDICINE

## 2018-07-10 PROCEDURE — 85025 COMPLETE CBC W/AUTO DIFF WBC: CPT | Performed by: EMERGENCY MEDICINE

## 2018-07-10 PROCEDURE — 80053 COMPREHEN METABOLIC PANEL: CPT | Performed by: EMERGENCY MEDICINE

## 2018-07-10 PROCEDURE — 82550 ASSAY OF CK (CPK): CPT | Performed by: EMERGENCY MEDICINE

## 2018-07-10 PROCEDURE — 36415 COLL VENOUS BLD VENIPUNCTURE: CPT | Performed by: EMERGENCY MEDICINE

## 2018-07-10 PROCEDURE — 74011000250 HC RX REV CODE- 250: Performed by: EMERGENCY MEDICINE

## 2018-07-10 PROCEDURE — 71045 X-RAY EXAM CHEST 1 VIEW: CPT

## 2018-07-10 PROCEDURE — 83690 ASSAY OF LIPASE: CPT | Performed by: EMERGENCY MEDICINE

## 2018-07-10 PROCEDURE — 74011250636 HC RX REV CODE- 250/636: Performed by: EMERGENCY MEDICINE

## 2018-07-10 PROCEDURE — 74011250637 HC RX REV CODE- 250/637: Performed by: EMERGENCY MEDICINE

## 2018-07-10 RX ORDER — FAMOTIDINE 20 MG/1
20 TABLET, FILM COATED ORAL 2 TIMES DAILY
Qty: 20 TAB | Refills: 0 | Status: SHIPPED | OUTPATIENT
Start: 2018-07-10 | End: 2018-07-20

## 2018-07-10 RX ORDER — ONDANSETRON 2 MG/ML
4 INJECTION INTRAMUSCULAR; INTRAVENOUS
Status: COMPLETED | OUTPATIENT
Start: 2018-07-10 | End: 2018-07-10

## 2018-07-10 RX ORDER — DICYCLOMINE HYDROCHLORIDE 20 MG/1
20 TABLET ORAL EVERY 6 HOURS
Qty: 20 TAB | Refills: 0 | Status: SHIPPED | OUTPATIENT
Start: 2018-07-10 | End: 2018-07-15

## 2018-07-10 RX ORDER — BUTALBITAL, ACETAMINOPHEN AND CAFFEINE 300; 40; 50 MG/1; MG/1; MG/1
1 CAPSULE ORAL
Qty: 20 CAP | Refills: 0 | Status: SHIPPED | OUTPATIENT
Start: 2018-07-10 | End: 2019-01-13

## 2018-07-10 RX ORDER — MORPHINE SULFATE 2 MG/ML
2 INJECTION, SOLUTION INTRAMUSCULAR; INTRAVENOUS
Status: COMPLETED | OUTPATIENT
Start: 2018-07-10 | End: 2018-07-10

## 2018-07-10 RX ORDER — BUTALBITAL, ACETAMINOPHEN AND CAFFEINE 50; 325; 40 MG/1; MG/1; MG/1
1 TABLET ORAL
Status: COMPLETED | OUTPATIENT
Start: 2018-07-10 | End: 2018-07-10

## 2018-07-10 RX ORDER — ONDANSETRON 4 MG/1
4 TABLET, ORALLY DISINTEGRATING ORAL
Qty: 20 TAB | Refills: 0 | Status: SHIPPED | OUTPATIENT
Start: 2018-07-10 | End: 2019-01-13

## 2018-07-10 RX ADMIN — SODIUM CHLORIDE 500 ML: 900 INJECTION, SOLUTION INTRAVENOUS at 01:13

## 2018-07-10 RX ADMIN — MORPHINE SULFATE 2 MG: 2 INJECTION, SOLUTION INTRAMUSCULAR; INTRAVENOUS at 01:03

## 2018-07-10 RX ADMIN — FAMOTIDINE 20 MG: 10 INJECTION, SOLUTION INTRAVENOUS at 01:03

## 2018-07-10 RX ADMIN — BUTALBITAL, ACETAMINOPHEN AND CAFFEINE 1 TABLET: 50; 325; 40 TABLET ORAL at 01:04

## 2018-07-10 RX ADMIN — ONDANSETRON 4 MG: 2 INJECTION, SOLUTION INTRAMUSCULAR; INTRAVENOUS at 01:03

## 2018-07-10 NOTE — ED NOTES
Pt presents with abdominal pain since July 4th. Pt reports n/v/d. Pt denies chest pain, lindy SOB. Pt AOX4. Pt COA. Wife bedside. Bed locked and in low position, call bell within reach.

## 2018-07-10 NOTE — ED PROVIDER NOTES
EMERGENCY DEPARTMENT HISTORY AND PHYSICAL EXAM 
 
 
 
Date: 7/10/2018 Patient Name: Melissa Schwab History of Presenting Illness Chief Complaint Patient presents with  Abdominal Pain  
  midepigastric pain for couple of days  Vomiting  
  not keeping liquids down; started with diarrhea now just vomiting  Dizziness  
  complaints of headache and dizziness History Provided By: Patient HPI: Melissa Schwab, 50 y.o. male, presents ambulatory to the ED with cc of persistent, sore epigastric pain x1 week. Pt reports associated nausea and vomiting with secondary decreased appetite. He notes his sxs onset s/p having a tooth pulled at [facility]. Pt states he was not d/c with antibiotics. He notes his last bowel movement was a couple of days ago. Pt states he was advised to take Ibuprofen and Tylenol, but has had no relief given the persistent vomiting. Pt is otherwise healthy and denies any long standing illnesses or medications. Pt specifically denies any fever, chills, cough, congestion, shortness of breath, chest pain, diarrhea, dysuria, or urinary frequency. PMHx: Significant for none PSHx: Significant for EGD Social Hx: +tobacco, -EtOH, -Illicit Drugs PCP: None There are no other complaints, changes, or physical findings at this time. Current Outpatient Prescriptions Medication Sig Dispense Refill  ondansetron (ZOFRAN ODT) 4 mg disintegrating tablet Take 1 Tab by mouth every eight (8) hours as needed for Nausea. 20 Tab 0  
 famotidine (PEPCID) 20 mg tablet Take 1 Tab by mouth two (2) times a day for 10 days. 20 Tab 0  
 dicyclomine (BENTYL) 20 mg tablet Take 1 Tab by mouth every six (6) hours for 20 doses. 20 Tab 0  
 butalbital-acetaminophen-caff (FIORICET) -40 mg per capsule Take 1 Cap by mouth every four (4) hours as needed for Pain. 20 Cap 0  
 HYDROcodone-acetaminophen (NORCO) 5-325 mg per tablet Take 1 Tab by mouth every four (4) hours as needed for Pain. Max Daily Amount: 6 Tabs. 20 Tab 0  promethazine (PHENERGAN) 25 mg tablet Take 1 Tab by mouth every six (6) hours as needed. 12 Tab 0  
 omeprazole (PRILOSEC OTC) 20 mg tablet Take 20 mg by mouth daily. 30 Tab 0 Past History Past Medical History: 
Past Medical History:  
Diagnosis Date  Gastrointestinal disorder  Hypertension  Neurological disorder   
 migraines  Other ill-defined conditions Sepsis Past Surgical History: 
Past Surgical History:  
Procedure Laterality Date  HX HEENT    
 teeth pulled  HX OTHER SURGICAL    
 cyst head removed  SD EGD TRANSORAL BIOPSY SINGLE/MULTIPLE  1/9/2014 Family History: No family history on file. Social History: 
Social History Substance Use Topics  Smoking status: Current Every Day Smoker Packs/day: 1.00  Smokeless tobacco: Not on file  Alcohol use No  
 
 
Allergies: 
No Known Allergies Review of Systems Review of Systems Constitutional: Positive for appetite change. Negative for chills and fever. HENT: Negative. Negative for congestion, facial swelling, rhinorrhea, sore throat, trouble swallowing and voice change. Eyes: Negative. Respiratory: Negative. Negative for apnea, cough, chest tightness, shortness of breath and wheezing. Cardiovascular: Negative. Negative for chest pain, palpitations and leg swelling. Gastrointestinal: Positive for abdominal pain, nausea and vomiting. Negative for abdominal distention, blood in stool, constipation and diarrhea. Endocrine: Negative. Negative for cold intolerance, heat intolerance and polyuria. Genitourinary: Negative. Negative for difficulty urinating, dysuria, flank pain, frequency, hematuria and urgency. Musculoskeletal: Negative. Negative for arthralgias, back pain, myalgias, neck pain and neck stiffness. Skin: Negative. Negative for color change and rash. Neurological: Negative.   Negative for dizziness, syncope, facial asymmetry, speech difficulty, weakness, light-headedness, numbness and headaches. Hematological: Negative. Does not bruise/bleed easily. Psychiatric/Behavioral: Negative. Negative for confusion and self-injury. The patient is not nervous/anxious. Physical Exam  
Physical Exam  
Constitutional: He is oriented to person, place, and time. Vital signs are normal. He appears well-developed and well-nourished. He is cooperative. Non-toxic appearance. HENT:  
Head: Normocephalic and atraumatic. Mouth/Throat: Mucous membranes are normal. No posterior oropharyngeal erythema. Eyes: Conjunctivae and EOM are normal. Pupils are equal, round, and reactive to light. Neck: Normal range of motion. Cardiovascular: Normal rate, regular rhythm, normal heart sounds and intact distal pulses. Exam reveals no gallop and no friction rub. No murmur heard. Pulmonary/Chest: Effort normal and breath sounds normal. No respiratory distress. He has no wheezes. He has no rales. He exhibits no tenderness. Abdominal: Soft. Bowel sounds are normal. He exhibits no distension and no mass. There is tenderness (mild) in the epigastric area. There is no rebound, no guarding and negative Guevara's sign. Musculoskeletal: Normal range of motion. He exhibits no edema, tenderness or deformity. Neurological: He is alert and oriented to person, place, and time. He displays normal reflexes. No cranial nerve deficit. He exhibits normal muscle tone. Coordination normal.  
Skin: Skin is warm. No rash noted. Psychiatric: He has a normal mood and affect. Nursing note and vitals reviewed. Diagnostic Study Results Labs - Recent Results (from the past 12 hour(s)) EKG, 12 LEAD, INITIAL Collection Time: 07/09/18  9:36 PM  
Result Value Ref Range Ventricular Rate 65 BPM  
 Atrial Rate 65 BPM  
 P-R Interval 176 ms QRS Duration 112 ms  
 Q-T Interval 434 ms QTC Calculation (Bezet) 451 ms  Calculated P Axis 45 degrees Calculated R Axis 72 degrees Calculated T Axis 54 degrees Diagnosis Normal sinus rhythm Normal ECG When compared with ECG of 18-DEC-2017 17:34, No significant change was found CBC WITH AUTOMATED DIFF Collection Time: 07/10/18  1:02 AM  
Result Value Ref Range WBC 10.2 4.1 - 11.1 K/uL  
 RBC 4.88 4.10 - 5.70 M/uL  
 HGB 15.2 12.1 - 17.0 g/dL HCT 47.7 36.6 - 50.3 % MCV 97.7 80.0 - 99.0 FL  
 MCH 31.1 26.0 - 34.0 PG  
 MCHC 31.9 30.0 - 36.5 g/dL  
 RDW 12.1 11.5 - 14.5 % PLATELET 186 836 - 831 K/uL MPV 9.3 8.9 - 12.9 FL  
 NRBC 0.0 0  WBC ABSOLUTE NRBC 0.00 0.00 - 0.01 K/uL NEUTROPHILS 69 32 - 75 % LYMPHOCYTES 24 12 - 49 % MONOCYTES 6 5 - 13 % EOSINOPHILS 1 0 - 7 % BASOPHILS 0 0 - 1 % IMMATURE GRANULOCYTES 0 0.0 - 0.5 % ABS. NEUTROPHILS 7.0 1.8 - 8.0 K/UL  
 ABS. LYMPHOCYTES 2.4 0.8 - 3.5 K/UL  
 ABS. MONOCYTES 0.6 0.0 - 1.0 K/UL  
 ABS. EOSINOPHILS 0.1 0.0 - 0.4 K/UL  
 ABS. BASOPHILS 0.0 0.0 - 0.1 K/UL  
 ABS. IMM. GRANS. 0.0 0.00 - 0.04 K/UL  
 DF AUTOMATED METABOLIC PANEL, COMPREHENSIVE Collection Time: 07/10/18  1:02 AM  
Result Value Ref Range Sodium 138 136 - 145 mmol/L Potassium 3.8 3.5 - 5.1 mmol/L Chloride 102 97 - 108 mmol/L  
 CO2 28 21 - 32 mmol/L Anion gap 8 5 - 15 mmol/L Glucose 94 65 - 100 mg/dL BUN 12 6 - 20 MG/DL Creatinine 1.28 0.70 - 1.30 MG/DL  
 BUN/Creatinine ratio 9 (L) 12 - 20 GFR est AA >60 >60 ml/min/1.73m2 GFR est non-AA 59 (L) >60 ml/min/1.73m2 Calcium 9.6 8.5 - 10.1 MG/DL Bilirubin, total 0.5 0.2 - 1.0 MG/DL  
 ALT (SGPT) 23 12 - 78 U/L  
 AST (SGOT) 13 (L) 15 - 37 U/L Alk. phosphatase 52 45 - 117 U/L Protein, total 8.3 (H) 6.4 - 8.2 g/dL Albumin 4.2 3.5 - 5.0 g/dL Globulin 4.1 (H) 2.0 - 4.0 g/dL A-G Ratio 1.0 (L) 1.1 - 2.2 CK W/ CKMB & INDEX Collection Time: 07/10/18  1:02 AM  
Result Value Ref Range   39 - 308 U/L  
 CK - MB <1.0 <3.6 NG/ML  
 CK-MB Index Cannot be calculated 0 - 2.5    
TROPONIN I Collection Time: 07/10/18  1:02 AM  
Result Value Ref Range Troponin-I, Qt. <0.05 <0.05 ng/mL LIPASE Collection Time: 07/10/18  1:02 AM  
Result Value Ref Range Lipase 324 73 - 393 U/L Radiologic Studies -  
XR CHEST PORT Final Result CT Results  (Last 48 hours) None CXR Results  (Last 48 hours) 07/10/18 0130  XR CHEST PORT Final result Impression:  IMPRESSION: No acute process. Stable exam.   
   
  
 Narrative:  EXAM:  CR chest portable INDICATION:  Dizziness. COMPARISON: 12/18/2017. TECHNIQUE: Portable AP upright chest view at 0119 hours FINDINGS: The cardiomediastinal contours are stable. The lungs and pleural  
spaces are clear. There is no pneumothorax. The bones and upper abdomen are  
stable. Medical Decision Making I am the first provider for this patient. I reviewed the vital signs, available nursing notes, past medical history, past surgical history, family history and social history. Vital Signs-Reviewed the patient's vital signs. Patient Vitals for the past 12 hrs: 
 Temp Pulse Resp BP SpO2  
07/10/18 0130 - - - (!) 137/94 96 % 07/10/18 0103 - - - (!) 160/95 96 % 07/10/18 0036 - 70 - (!) 155/92 97 % 07/10/18 0034 - - - - 98 % 07/09/18 2130 97.8 °F (36.6 °C) 69 14 - 98 % Pulse Oximetry Analysis - 98% on RA Cardiac Monitor:  
Rate: 69 bpm 
Rhythm: Normal Sinus Rhythm EKG interpretation: (Preliminary) Collection Time: 07/09/18  9:36 PM  
Result Value Ref Range Ventricular Rate 65 BPM  
 Atrial Rate 65 BPM  
 P-R Interval 176 ms QRS Duration 112 ms  
 Q-T Interval 434 ms QTC Calculation (Bezet) 451 ms Calculated P Axis 45 degrees Calculated R Axis 72 degrees Calculated T Axis 54 degrees Diagnosis Normal sinus rhythm Normal ECG When compared with ECG of 18-DEC-2017 17:34, No significant change was found Records Reviewed: Nursing Notes and Old Medical Records Provider Notes (Medical Decision Making): DDx: electrolyte disturbance, viral illness, gastroenteritis 49 yo male presenting with abd pain n/v/d. Vitals stable, exam benign. Will check ekg, labs. Will treat symptomatically and reassess. ED Course:  
Initial assessment performed. The patients presenting problems have been discussed, and they are in agreement with the care plan formulated and outlined with them. I have encouraged them to ask questions as they arise throughout their visit. Medications  
ondansetron (ZOFRAN) injection 4 mg (4 mg IntraVENous Given 7/10/18 0103) morphine injection 2 mg (2 mg IntraVENous Given 7/10/18 0103) famotidine (PF) (PEPCID) 20 mg in sodium chloride 0.9% 10 mL injection (20 mg IntraVENous Given 7/10/18 0103) butalbital-acetaminophen-caffeine (FIORICET, ESGIC) -40 mg per tablet 1 Tab (1 Tab Oral Given 7/10/18 0104) sodium chloride 0.9 % bolus infusion 500 mL (0 mL IntraVENous IV Completed 7/10/18 0143) TOBACCO COUNSELING: 
Upon evaluation, pt expressed that they are a current tobacco user. For approximately 10 minutes, pt has been counseled on the dangers of smoking and was encouraged to quit as soon as possible in order to decrease further risks to their health. Pt has conveyed their understanding of the risks involved should they continue to use tobacco products. Progress Note: 
Pt states he feels much better; pain resolved; denies any nausea; no new symptoms; pt able to tolerate PO and ambulate without issues; pt clinically safe for discharge home with close PCP f/u. At time of discharge, pt had stable vitals and had no questions or concerns, and was very satisfied with overall care. Progress note: 
1:57 AM 
Pt noted to be feeling better, ready for discharge. Updated pt and/or family on all final lab and imaging findings. Will follow up as instructed.  All questions have been answered, pt voiced understanding and agreement with plan. Specific return precautions provided as well as instructions to return to the ED should sx worsen at any time. Vital signs stable for discharge. Written by Gladys Rodriguez, ED Scribe, as dictated by Beatriz Loyola MD 
 
Critical Care Time:  
none Disposition: 
Discharge Note: 
1:59 AM 
The pt is ready for discharge. The pt's signs, symptoms, diagnosis, and discharge instructions have been discussed and pt has conveyed their understanding. The pt is to follow up as recommended or return to ER should their symptoms worsen. Plan has been discussed and pt is in agreement. PLAN: 
1. Current Discharge Medication List  
  
START taking these medications Details  
ondansetron (ZOFRAN ODT) 4 mg disintegrating tablet Take 1 Tab by mouth every eight (8) hours as needed for Nausea. Qty: 20 Tab, Refills: 0  
  
famotidine (PEPCID) 20 mg tablet Take 1 Tab by mouth two (2) times a day for 10 days. Qty: 20 Tab, Refills: 0  
  
dicyclomine (BENTYL) 20 mg tablet Take 1 Tab by mouth every six (6) hours for 20 doses. Qty: 20 Tab, Refills: 0  
  
butalbital-acetaminophen-caff (FIORICET) -40 mg per capsule Take 1 Cap by mouth every four (4) hours as needed for Pain. Qty: 20 Cap, Refills: 0  
  
  
 
2. Follow-up Information Follow up With Details Comments Contact Info None   None ([phone] Patient stated that they have no PCP [facility]  As needed, If symptoms worsen [address] [facility] 
[phone] Return to ED if worse Diagnosis Clinical Impression: 1. Tension-type headache, not intractable, unspecified chronicity pattern 2. Nausea and vomiting, intractability of vomiting not specified, unspecified vomiting type 3. Abdominal pain, epigastric 4. Dizziness 5. Accelerated hypertension Attestations:  
 
This note is prepared by Gladys Rodriguez, acting as Scribe for Arun Brizuela MD 
 
 
The scribe's documentation has been prepared under my direction and personally reviewed by me in its entirety. I confirm that the note above accurately reflects all work, treatment, procedures, and medical decision making performed by me. Arun Brizuela MD 
 
 
 
This note will not be viewable in [address]

## 2018-07-10 NOTE — DISCHARGE INSTRUCTIONS
Thank you! Thank you for allowing us to provide you with excellent care today. We hope we addressed all of your concerns and needs. We strive to provide excellent quality care in the Emergency Department. You may receive a survey after your visit to evaluate the care you were provided. Should you receive a survey from us, we invite you to share your experience and tell us what made it excellent. It was a pleasure serving you, we invite you to share your experience with us, in our pursuit for excellence, should you be selected to receive a survey. If you feel that you have not received excellent quality care or timely care, please ask to speak to the nurse manager. Please choose us in the future for your continued health care needs. ------------------------------------------------------------------------------------------------------------  The exam and treatment you received in the Emergency Department were for an urgent problem and are not intended as complete care. It is important that you follow up with a doctor, nurse practitioner, or physician assistant for ongoing care. If your symptoms become worse or you do not improve as expected and you are unable to reach your usual health care provider, you should return to the Emergency Department. We are available 24 hours a day. Please take your discharge instructions with you when you go to your follow-up appointment. If you have any problem arranging a follow-up appointment, contact the Emergency Department immediately. If a prescription has been provided, please have it filled as soon as possible to prevent a delay in treatment. Read the entire medication instruction sheet provided to you by the pharmacy. If you have any questions or reservations about taking the medication due to side effects or interactions with other medications, please call your primary care physician or contact the ER to speak with the charge nurse.      Make an appointment with your family doctor or the physician you were referred to for follow-up of this visit as instructed on your discharge paperwork, as this is mandatory follow-up. Return to the ER if you are unable to be seen or if you are unable to be seen in a timely manner. If you have any problem arranging the follow-up visit, contact the Emergency Department immediately. Abdominal Pain: Care Instructions  Your Care Instructions    Abdominal pain has many possible causes. Some aren't serious and get better on their own in a few days. Others need more testing and treatment. If your pain continues or gets worse, you need to be rechecked and may need more tests to find out what is wrong. You may need surgery to correct the problem. Don't ignore new symptoms, such as fever, nausea and vomiting, urination problems, pain that gets worse, and dizziness. These may be signs of a more serious problem. Your doctor may have recommended a follow-up visit in the next 8 to 12 hours. If you are not getting better, you may need more tests or treatment. The doctor has checked you carefully, but problems can develop later. If you notice any problems or new symptoms, get medical treatment right away. Follow-up care is a key part of your treatment and safety. Be sure to make and go to all appointments, and call your doctor if you are having problems. It's also a good idea to know your test results and keep a list of the medicines you take. How can you care for yourself at home? · Rest until you feel better. · To prevent dehydration, drink plenty of fluids, enough so that your urine is light yellow or clear like water. Choose water and other caffeine-free clear liquids until you feel better. If you have kidney, heart, or liver disease and have to limit fluids, talk with your doctor before you increase the amount of fluids you drink.   · If your stomach is upset, eat mild foods, such as rice, dry toast or crackers, bananas, and applesauce. Try eating several small meals instead of two or three large ones. · Wait until 48 hours after all symptoms have gone away before you have spicy foods, alcohol, and drinks that contain caffeine. · Do not eat foods that are high in fat. · Avoid anti-inflammatory medicines such as aspirin, ibuprofen (Advil, Motrin), and naproxen (Aleve). These can cause stomach upset. Talk to your doctor if you take daily aspirin for another health problem. When should you call for help? Call 911 anytime you think you may need emergency care. For example, call if:  ? · You passed out (lost consciousness). ? · You pass maroon or very bloody stools. ? · You vomit blood or what looks like coffee grounds. ? · You have new, severe belly pain. ?Call your doctor now or seek immediate medical care if:  ? · Your pain gets worse, especially if it becomes focused in one area of your belly. ? · You have a new or higher fever. ? · Your stools are black and look like tar, or they have streaks of blood. ? · You have unexpected vaginal bleeding. ? · You have symptoms of a urinary tract infection. These may include:  ¨ Pain when you urinate. ¨ Urinating more often than usual.  ¨ Blood in your urine. ? · You are dizzy or lightheaded, or you feel like you may faint. ? Watch closely for changes in your health, and be sure to contact your doctor if:  ? · You are not getting better after 1 day (24 hours). Where can you learn more? Go to http://karly-julieth.info/. Enter P998 in the search box to learn more about \"Abdominal Pain: Care Instructions. \"  Current as of: March 20, 2017  Content Version: 11.4  © 6760-0536 [employer]. Care instructions adapted under license by [employer] (which disclaims liability or warranty for this information).  If you have questions about a medical condition or this instruction, always ask your healthcare professional. [employer] disclaims any warranty or liability for your use of this information.

## 2018-07-10 NOTE — ED NOTES
Dr Lisbet Iraheta has reviewed discharge instructions with the patient. The patient verbalized understanding. Pt. A&Ox4, respirations even and unlabored. VS stable as noted in flowsheet. Pt Declined wheelchair assist from department; paperwork in hand.

## 2018-07-11 LAB
ATRIAL RATE: 65 BPM
CALCULATED P AXIS, ECG09: 45 DEGREES
CALCULATED R AXIS, ECG10: 72 DEGREES
CALCULATED T AXIS, ECG11: 54 DEGREES
DIAGNOSIS, 93000: NORMAL
P-R INTERVAL, ECG05: 176 MS
Q-T INTERVAL, ECG07: 434 MS
QRS DURATION, ECG06: 112 MS
QTC CALCULATION (BEZET), ECG08: 451 MS
VENTRICULAR RATE, ECG03: 65 BPM

## 2018-07-18 ENCOUNTER — HOSPITAL ENCOUNTER (OUTPATIENT)
Dept: GENERAL RADIOLOGY | Age: 53
Discharge: HOME | End: 2018-07-18
Attending: EMERGENCY MEDICINE
Payer: SELF-PAY

## 2018-07-18 ENCOUNTER — HOSPITAL ENCOUNTER (EMERGENCY)
Age: 53
Discharge: HOME | End: 2018-07-18
Attending: EMERGENCY MEDICINE
Payer: SELF-PAY

## 2018-07-18 VITALS
HEIGHT: 68 IN | HEART RATE: 83 BPM | RESPIRATION RATE: 18 BRPM | TEMPERATURE: 99 F | WEIGHT: 212.3 LBS | DIASTOLIC BLOOD PRESSURE: 107 MMHG | BODY MASS INDEX: 32.18 KG/M2 | OXYGEN SATURATION: 96 % | SYSTOLIC BLOOD PRESSURE: 155 MMHG

## 2018-07-18 DIAGNOSIS — S61.412A LACERATION OF HAND, LEFT: ICD-10-CM

## 2018-07-18 DIAGNOSIS — S61.411A LACERATION OF RIGHT HAND WITHOUT FOREIGN BODY, INITIAL ENCOUNTER: Primary | ICD-10-CM

## 2018-07-18 PROCEDURE — 77030018836 HC SOL IRR NACL ICUM -A

## 2018-07-18 PROCEDURE — 77030031132 HC SUT NYL COVD -A

## 2018-07-18 PROCEDURE — 99282 EMERGENCY DEPT VISIT SF MDM: CPT

## 2018-07-18 PROCEDURE — 75810000293 HC SIMP/SUPERF WND  RPR

## 2018-07-18 PROCEDURE — 73120 X-RAY EXAM OF HAND: CPT

## 2018-07-18 RX ORDER — HYDROCODONE BITARTRATE AND ACETAMINOPHEN 5; 325 MG/1; MG/1
1 TABLET ORAL
Qty: 10 TAB | Refills: 0 | Status: SHIPPED | OUTPATIENT
Start: 2018-07-18 | End: 2019-01-13

## 2018-07-18 RX ORDER — IBUPROFEN 800 MG/1
800 TABLET ORAL
Qty: 20 TAB | Refills: 0 | Status: SHIPPED | OUTPATIENT
Start: 2018-07-18 | End: 2018-07-25

## 2018-07-18 NOTE — LETTER
[facility] EMERGENCY DEPT 
[address] [place] [address] [address] [id number] [phone] Work/School Note Date: 7/18/2018 To Whom It May concern: 
 
Guanakito Prasad was seen and treated today in the emergency room by the following provider(s): 
Attending Provider: Elizbaeth Ty MD 
Physician Assistant: ANITA Ortez. Guanakito Prasad may return to work on [id number]. Sincerely, ANITA Ortez

## 2018-07-18 NOTE — ED PROVIDER NOTES
EMERGENCY DEPARTMENT HISTORY AND PHYSICAL EXAM 
 
 
Date: 7/18/2018 Patient Name: Cordell Bills History of Presenting Illness Chief Complaint Patient presents with  Laceration  
  ambulatory into triage; pt reports lacerations to bilateral hands from a \"glass falling at work\"; pt wounds wrapped with pressure dressings; onset 30 min PTA History Provided By: Patient HPI: Cordell Bills, 50 y.o. male  presents ambulatory to the ED with cc of 1 hours of 7 out of 10 constant sharp right hand pain that is worse with movement after breaking 2 drinking glasses while at work. Chief Complaint: hand lac Duration: 30 Minutes Timing:  Acute Location: right hand Quality: Sofia Saliva Severity: 7 out of 10 Modifying Factors: worse with movement Associated Symptoms: denies any other associated signs or symptoms There are no other complaints, changes, or physical findings at this time. PCP: None Current Outpatient Prescriptions Medication Sig Dispense Refill  
 HYDROcodone-acetaminophen (NORCO) 5-325 mg per tablet Take 1 Tab by mouth every four (4) hours as needed for Pain. Max Daily Amount: 6 Tabs. 10 Tab 0  ibuprofen (MOTRIN) 800 mg tablet Take 1 Tab by mouth every eight (8) hours as needed for Pain for up to 7 days. 20 Tab 0  
 ondansetron (ZOFRAN ODT) 4 mg disintegrating tablet Take 1 Tab by mouth every eight (8) hours as needed for Nausea. 20 Tab 0  
 famotidine (PEPCID) 20 mg tablet Take 1 Tab by mouth two (2) times a day for 10 days. 20 Tab 0  
 butalbital-acetaminophen-caff (FIORICET) -40 mg per capsule Take 1 Cap by mouth every four (4) hours as needed for Pain. 20 Cap 0  
 HYDROcodone-acetaminophen (NORCO) 5-325 mg per tablet Take 1 Tab by mouth every four (4) hours as needed for Pain. Max Daily Amount: 6 Tabs. 20 Tab 0  promethazine (PHENERGAN) 25 mg tablet Take 1 Tab by mouth every six (6) hours as needed.  12 Tab 0  
 omeprazole (PRILOSEC OTC) 20 mg tablet Take 20 mg by mouth daily. 30 Tab 0 Past History Past Medical History: 
Past Medical History:  
Diagnosis Date  Gastrointestinal disorder  Hypertension  Neurological disorder   
 migraines  Other ill-defined conditions(799.89) Sepsis Past Surgical History: 
Past Surgical History:  
Procedure Laterality Date  HX HEENT    
 teeth pulled  HX OTHER SURGICAL    
 cyst head removed  [place] EGD TRANSORAL BIOPSY SINGLE/MULTIPLE  1/9/2014 Family History: 
History reviewed. No pertinent family history. Social History: 
Social History Substance Use Topics  Smoking status: Current Every Day Smoker Packs/day: 1.00  Smokeless tobacco: Never Used  Alcohol use No  
 
 
Allergies: 
No Known Allergies Review of Systems Review of Systems Constitutional: Negative for fatigue and fever. HENT: Negative for congestion, ear pain and rhinorrhea. Eyes: Negative for pain and redness. Respiratory: Negative for cough and wheezing. Cardiovascular: Negative for chest pain and palpitations. Gastrointestinal: Negative for abdominal pain, nausea and vomiting. Genitourinary: Negative for dysuria, frequency and urgency. Musculoskeletal: Negative for back pain, neck pain and neck stiffness. Skin: Positive for wound. Negative for rash. Neurological: Negative for weakness, light-headedness, numbness and headaches. Physical Exam  
Physical Exam  
Constitutional: He is oriented to person, place, and time. He appears well-developed and well-nourished. Non-toxic appearance. No distress. HENT:  
Head: Normocephalic and atraumatic. Head is without right periorbital erythema and without left periorbital erythema. Right Ear: External ear normal.  
Left Ear: External ear normal.  
Nose: Nose normal.  
Mouth/Throat: Uvula is midline. No trismus in the jaw. Eyes: Conjunctivae and EOM are normal. Pupils are equal, round, and reactive to light. No scleral icterus. Neck: Normal range of motion and full passive range of motion without pain. Cardiovascular: Normal rate, regular rhythm and normal heart sounds. Pulmonary/Chest: Effort normal and breath sounds normal. No accessory muscle usage. No tachypnea. No respiratory distress. He has no decreased breath sounds. He has no wheezes. Abdominal: Soft. There is no tenderness. There is no rigidity and no guarding. Musculoskeletal: Normal range of motion. Right hand: He exhibits laceration. Hands: 
RIGHT INDEX FINGER: 
1.5cm flap laceration over the dorsum between the PIPJ and DIPJ. FAROM. No foreign body visible RIGHT MIDDLE FINGER: 
Small (6mm) avulsion of skin overlying the dorsum between the PIPJ and DIPJ. Nolene [place] Neurological: He is alert and oriented to person, place, and time. He is not disoriented. No cranial nerve deficit or sensory deficit. GCS eye subscore is 4. GCS verbal subscore is 5. GCS motor subscore is 6. Skin: Skin is intact. No rash noted. Psychiatric: He has a normal mood and affect. His speech is normal.  
Nursing note and vitals reviewed. Diagnostic Study Results Labs - No results found for this or any previous visit (from the past 12 hour(s)). Radiologic Studies - No orders to display CT Results  (Last 48 hours) None CXR Results  (Last 48 hours) None Medical Decision Making I am the first provider for this patient. I reviewed the vital signs, available nursing notes, past medical history, past surgical history, family history and social history. Vital Signs-Reviewed the patient's vital signs. No data found. Records Reviewed: Nursing Notes and Old Medical Records Provider Notes (Medical Decision Making): THIS PATIENT WAS SEEN DURING DOWNTIME AND THIS  
NOTE IS GENERATED AFTER THE FACT Tetanus is up to date I reviewed plain films: no fracture or foreign body identified.  Formal Radiology interpretation is pending Laceration repair as below Wound care instructions. Return precautions. Procedure Note - Laceration Repair: 
1:30 AM 
Procedure by Hitesh Hamilton PA-C. Complexity: simple 1.5cm flap laceration to right index finger  was irrigated copiously with NS under jet lavage, prepped with Hibiclens and draped in a sterile fashion. The area was anesthetized with 2 mLs of  Lidocaine 2% without epinephrine via digital block. The wound was explored with the following results: No foreign bodies found. The wound was repaired with One layer suture closure: Skin Layer:  4 sutures placed, stitch type:simple interrupted, suture: 4-0 nylon. .  The wound was closed with good hemostasis and approximation. Sterile dressing applied. Estimated blood loss: < 1mL The procedure took 1-15 minutes, and pt tolerated well. ED Course:  
Initial assessment performed. The patients presenting problems have been discussed, and they are in agreement with the care plan formulated and outlined with them. I have encouraged them to ask questions as they arise throughout their visit. Disposition: 
Discharge PLAN: 
1. Discharge Medication List as of 7/18/2018 12:58 AM  
  
START taking these medications Details  
!! HYDROcodone-acetaminophen (NORCO) 5-325 mg per tablet Take 1 Tab by mouth every four (4) hours as needed for Pain. Max Daily Amount: 6 Tabs., Print, Disp-10 Tab, R-0  
  
ibuprofen (MOTRIN) 800 mg tablet Take 1 Tab by mouth every eight (8) hours as needed for Pain for up to 7 days. , Print, Disp-20 Tab, R-0  
  
 !! - Potential duplicate medications found. Please discuss with provider. CONTINUE these medications which have NOT CHANGED Details  
ondansetron (ZOFRAN ODT) 4 mg disintegrating tablet Take 1 Tab by mouth every eight (8) hours as needed for Nausea. , Print, Disp-20 Tab, R-0  
  
famotidine (PEPCID) 20 mg tablet Take 1 Tab by mouth two (2) times a day for 10 days. , Print, Disp-20 Tab, R-0  
 butalbital-acetaminophen-caff (FIORICET) -40 mg per capsule Take 1 Cap by mouth every four (4) hours as needed for Pain., Print, Disp-20 Cap, R-0  
  
!! HYDROcodone-acetaminophen (NORCO) 5-325 mg per tablet Take 1 Tab by mouth every four (4) hours as needed for Pain. Max Daily Amount: 6 Tabs., Print, Disp-20 Tab, R-0  
  
promethazine (PHENERGAN) 25 mg tablet Take 1 Tab by mouth every six (6) hours as needed. , Print, Disp-12 Tab, R-0  
  
omeprazole (PRILOSEC OTC) 20 mg tablet Take 20 mg by mouth daily. , Print, Disp-30 Tab, R-0  
  
 !! - Potential duplicate medications found. Please discuss with provider. 2.  
Follow-up Information Follow up With Details Comments Contact Info [address] Schedule an appointment as soon as possible for a visit in 1 week PRIMARY CARE: have stitches removed in 7-10 days [address] [facility] [id number] [phone] Return to ED if worse Diagnosis Clinical Impression: 1. Laceration of right hand without foreign body, initial encounter

## 2018-07-18 NOTE — DISCHARGE INSTRUCTIONS
[facility] SYSTEMS Departments     For adult and child immunizations, family planning, TB screening, STD testing and women's health services. [facility] [place] [phone]      [facility] 25   [address]   [address]   170 [facility]: Charis Arango [address] [phone]      [address]          [address] [place] [address]     For primary care services, woman and child wellness, and some clinics providing specialty care. [facility] -- [facility] [facility]5 [facility] [phone]   411 [facility] 200 [facility] 3614 [facility] [phone]   339 [facility] [address] [facility] [address] [phone]   [address]  [address] [place] [address] 5850  Community  [phone]   [address] [place] [address] [address] [phone]   [facility] 81 [facility] [phone]   Leonides Sanches [facility] 10[address] [place] [phone]   [facility]: [facility] [address] [place] [address], [address] [phone]     Rikki [address] [facility] [address] [phone]   [facility] Outreach 5850 [facility]  [phone]   [employer]  [address], [address] ([email or web address]/[email or web address]) [phone]         Sexual Health/Woman Wellness Clinics    For STD/HIV testing and treatment, pregnancy testing and services, men's health, birth control services, LGBT services, and hepatitis/HPV vaccine services. [employer] for [place] [employer] [address]. [facility] [address] [facility] [address] [facility] 1579 600 EBabs [facility] [phone]   [facility] [address], 5th floor [phone]   Pregnancy [address] Women [facility] JOSE ALFREDO Philip [place] [phone]         Specialty Service 1709 [facility]   [phone]   [place]   [phone]   Women, Infant and Children's Services: [address] [phone] 600 [facility]   [phone]   [address]   8527 [facility] Psychiatry     [phone]   [address] Crisis   1212 [facility] [phone]     Local Primary Care Physicians  [facility] [phone]  MD Noris Talley MD Floyce Lade, MD [facility] Doctors [phone]  Kathy Moura, P  MD Arun Nunez MD Wilfredo Showman, MD [address] [facility] [phone]  MD Christie Hills MD [address] [facility] [phone]  MD Stacie aDvis, MD Jacob Murcia MD   [facility] [phone]  TE WTCRYP MM, MD Brittany Reno, NP [facility] [phone]  MD Lo Garcia MD Cheryal Conner, MD Richmond Silvers, MD Zigmund Solan, MD Gwen Rundle, MD Sherel Marten, MD   33 57 [facility]  Claudio Thompson MD 1300 [address] [facility] [phone]  MD Haim Jernigan, NP  Amna Mejía, MD Jessica Grissom MD Sandi Mort, MD Cecelia Killian, MD   8615 [facility] [phone]  MD Bj Becker, P  Gissel Mcekon, NP  Chiqui Syed, MD Khoa Schofield MD Clemmie Haver, MD [facility] [phone]  Brian Saldivar, MD Laurita Orellana MD Becki Hardy, MD Lannie Shoemaker, MD   [phone]  MD Talib Nation Asp, MD [place] [phone]  MD Vijaya Batista, MD Morena Osorio Jeffery Ferrer, [address] [facility] [phone]  Allyssa Young, MD Jeison Aguillon, MD Marissa Perez, MD Pavel Sharpe, MD Shelli Carrillo, MD Surinder Cary, NP  Walter Garcia MD 1619 [facility]   [phone]  MD Leonela Badillo MD Genie Darter, MD   2102 [facility] [phone]  Alysha Murphy, MD Rolinda Harada, PORSHA Vega, LUCITAP  PORSHA Phillips MD Merriam Prow, NP   Osiel Sanchez, DO Miscellaneous:  Maurice Guillory [phone]

## 2019-01-13 ENCOUNTER — HOSPITAL ENCOUNTER (EMERGENCY)
Age: 54
Discharge: HOME | End: 2019-01-13
Attending: EMERGENCY MEDICINE
Payer: SELF-PAY

## 2019-01-13 ENCOUNTER — APPOINTMENT (OUTPATIENT)
Dept: GENERAL RADIOLOGY | Age: 54
End: 2019-01-13
Attending: EMERGENCY MEDICINE
Payer: SELF-PAY

## 2019-01-13 VITALS
TEMPERATURE: 98.3 F | RESPIRATION RATE: 16 BRPM | SYSTOLIC BLOOD PRESSURE: 162 MMHG | HEART RATE: 79 BPM | HEIGHT: 69 IN | OXYGEN SATURATION: 98 % | WEIGHT: 209.22 LBS | BODY MASS INDEX: 30.99 KG/M2 | DIASTOLIC BLOOD PRESSURE: 99 MMHG

## 2019-01-13 DIAGNOSIS — R03.0 ELEVATED BLOOD PRESSURE READING: ICD-10-CM

## 2019-01-13 DIAGNOSIS — J20.9 ACUTE BRONCHITIS, UNSPECIFIED ORGANISM: Primary | ICD-10-CM

## 2019-01-13 DIAGNOSIS — S29.019A THORACIC MYOFASCIAL STRAIN, INITIAL ENCOUNTER: ICD-10-CM

## 2019-01-13 PROCEDURE — 71100 X-RAY EXAM RIBS UNI 2 VIEWS: CPT

## 2019-01-13 PROCEDURE — 71046 X-RAY EXAM CHEST 2 VIEWS: CPT

## 2019-01-13 PROCEDURE — 99282 EMERGENCY DEPT VISIT SF MDM: CPT

## 2019-01-13 PROCEDURE — 71101 X-RAY EXAM UNILAT RIBS/CHEST: CPT

## 2019-01-13 RX ORDER — CEFDINIR 300 MG/1
300 CAPSULE ORAL 2 TIMES DAILY
Qty: 14 CAP | Refills: 0 | Status: SHIPPED | OUTPATIENT
Start: 2019-01-13 | End: 2019-01-20

## 2019-01-13 RX ORDER — AZITHROMYCIN 250 MG/1
TABLET, FILM COATED ORAL
Qty: 6 TAB | Refills: 0 | Status: SHIPPED | OUTPATIENT
Start: 2019-01-13

## 2019-01-13 RX ORDER — CODEINE PHOSPHATE AND GUAIFENESIN 10; 100 MG/5ML; MG/5ML
5 SOLUTION ORAL
Qty: 60 ML | Refills: 0 | Status: SHIPPED | OUTPATIENT
Start: 2019-01-13

## 2019-01-13 NOTE — ED NOTES
Dr. Kimi Mcintosh reviewed discharge instructions with the patient. The patient verbalized understanding. All questions and concerns were addressed. The patient declined a wheelchair and is discharged ambulatory in the care of family members with instructions and prescriptions in hand. Pt is alert and oriented x 4. Respirations are clear and unlabored.

## 2019-01-13 NOTE — DISCHARGE INSTRUCTIONS
Patient Education        Bronchitis: Care Instructions  Your Care Instructions    Bronchitis is inflammation of the bronchial tubes, which carry air to the lungs. The tubes swell and produce mucus, or phlegm. The mucus and inflamed bronchial tubes make you cough. You may have trouble breathing. Most cases of bronchitis are caused by viruses like those that cause colds. Antibiotics usually do not help and they may be harmful. Bronchitis usually develops rapidly and lasts about 2 to 3 weeks in otherwise healthy people. Follow-up care is a key part of your treatment and safety. Be sure to make and go to all appointments, and call your doctor if you are having problems. It's also a good idea to know your test results and keep a list of the medicines you take. How can you care for yourself at home? · Take all medicines exactly as prescribed. Call your doctor if you think you are having a problem with your medicine. · Get some extra rest.  · Take an over-the-counter pain medicine, such as acetaminophen (Tylenol), ibuprofen (Advil, Motrin), or naproxen (Aleve) to reduce fever and relieve body aches. Read and follow all instructions on the label. · Do not take two or more pain medicines at the same time unless the doctor told you to. Many pain medicines have acetaminophen, which is Tylenol. Too much acetaminophen (Tylenol) can be harmful. · Take an over-the-counter cough medicine that contains dextromethorphan to help quiet a dry, hacking cough so that you can sleep. Avoid cough medicines that have more than one active ingredient. Read and follow all instructions on the label. · Breathe moist air from a humidifier, hot shower, or sink filled with hot water. The heat and moisture will thin mucus so you can cough it out. · Do not smoke. Smoking can make bronchitis worse. If you need help quitting, talk to your doctor about stop-smoking programs and medicines.  These can increase your chances of quitting for good.  When should you call for help? Call 911 anytime you think you may need emergency care. For example, call if:    · You have severe trouble breathing.    Call your doctor now or seek immediate medical care if:    · You have new or worse trouble breathing.     · You cough up dark brown or bloody mucus (sputum).     · You have a new or higher fever.     · You have a new rash.    Watch closely for changes in your health, and be sure to contact your doctor if:    · You cough more deeply or more often, especially if you notice more mucus or a change in the color of your mucus.     · You are not getting better as expected. Where can you learn more? Go to http://karlyMePleasejulieth.info/. Enter H333 in the search box to learn more about \"Bronchitis: Care Instructions. \"  Current as of: December 6, 2017  Content Version: 11.8  © 2792-9060 [employer]. Care instructions adapted under license by [employer] (which disclaims liability or warranty for this information). If you have questions about a medical condition or this instruction, always ask your healthcare professional. [place] [address] any warranty or liability for your use of this information. Patient Education        Upper and Middle Back (Thoracic) Strain: Care Instructions  Overview    A back strain happens when you overstretch, or pull, a muscle in your back. You may hurt your back in an accident or when you exercise or lift something. Sometimes you may not know how you hurt your back. Most back pain will get better with rest and time. You can take care of yourself at home to help your back heal.  Follow-up care is a key part of your treatment and safety. Be sure to make and go to all appointments, and call your doctor if you are having problems. It's also a good idea to know your test results and keep a list of the medicines you take. How can you care for yourself at home?   · Try to stay as active as you can, but stop or reduce any activity that causes pain. · You can try using heat or ice to see if it helps. ? Try using a heating pad on a low or medium setting for 15 to 20 minutes every 2 to 3 hours. Try a warm shower in place of one session with the heating pad. You can also buy single-use heat wraps that last up to 8 hours. ? You can also try an ice pack for 10 to 15 minutes every 2 to 3 hours. Put a thin cloth between the ice and your skin. · Be safe with medicines. Read and follow all instructions on the label. ? If the doctor gave you a prescription medicine for pain, take it as prescribed. ? If you are not taking a prescription pain medicine, ask your doctor if you can take an over-the-counter medicine. · Try to find a comfortable sleeping position. ? Instead of your regular pillow, you can try a special neck pillow or a rolled-up towel under your neck. ? Try lying on your side with a pillow between your legs. Or lie on your back with a pillow under your knees. · Return to your usual level of activity slowly. When should you call for help? Call 911 anytime you think you may need emergency care. For example, call if:    · You are unable to move an arm or a leg at all.   [facility] your doctor now or seek immediate medical care if:    · You have new or worse symptoms in your arms, legs, chest, belly, or buttocks. Symptoms may include:  ? Numbness or tingling. ? Weakness. ? Pain.     · You lose bladder or bowel control.    Watch closely for changes in your health, and be sure to contact your doctor if:    · You are not getting better as expected. Where can you learn more? Go to http://karly-julieth.info/. Enter U110 in the search box to learn more about \"Upper and Middle Back (Thoracic) Strain: Care Instructions. \"  Current as of: Karis 15, 2018  Content Version: 11.8  © 9518-7918 Healthwise, Inc[employer].  Care instructions adapted under license by [employer] (which disclaims liability or warranty for this information). If you have questions about a medical condition or this instruction, always ask your healthcare professional. David Sibley any warranty or liability for your use of this information.       ==============================================    [facility] Departments     For adult and child immunizations, family planning, TB screening, STD testing and women's health services. [facility] [place] [phone]      [facility] 25   [address]   [address]   170 [facility]: [facility] 200 [facility] [phone] 2400 [facility]          [address] [place] [address]     For primary care services, woman and child wellness, and some clinics providing specialty care. [facility] -- 1011 [facility]. [address] [place] [address] [phone]   [address] [place] [address] 200 [facility] [address] [place] [address] [phone]   339 [facility] End [facility] [address]  [phone] [address] 5850 [facility]  [phone]   [address] [place] [address] [address] [place] [address] [phone]   [facility] 81 [facility] [phone]   [place] [address] [phone]   [facility]: [facility] [address] 79 [facility], [address] [phone]     [facility] [address] [facility] [address] [phone]   [facility] Outreach 5850 [facility]  [phone]   [employer]  [address] ([email or web address]/mission. [email or web address]) [phone]         Sexual Health/Woman Wellness Clinics    For STD/HIV testing and treatment, pregnancy testing and services, men's health, birth control services, LGBT services, and hepatitis/HPV vaccine services. [facility] [facility] [employer] [address] [phone] [facility] of [address] [facility] 1579 600 TADEO [place] [phone]   [facility] [address], 5th floor [phone]   Pregnancy [address]  [place] [phone]         [place] 9967 [facility] [address] [place] [address]   [phone]   [facility]   [phone]   Women, Infant and Children's Services: [address] [phone]       600 [facility]   [phone] 4800 [facility]   [phone]   [facility] Psychiatry     [phone]   [address] Crisis   1212 [facility] [phone]     Local Primary Care Physicians  [facility] [phone]  MD Susana Luna MD Roxene Bunkers, MD [facility] [phone]  Anayeli Morrell, [facility]  MD Carolina Alston MD Molly Frohlich, MD [address] [address] [phone]  [place] [address] [facility] [phone]  MD Casie Payton MD Gertrude Edis, MD Billie People, MD   [facility] [phone]  KKJW MD Opal WHITE MD Eilene Doe, NP [address] [facility] [facility] [address] [phone]  MD Mendez Mir MD Wyona Overlie, MD Roosvelt Destine, MD Dovie Delaine, MD Skeet Sidle, MD Marcheta Lauth, MD   6525 [facility] [phone]  Eduard Jeffery MD [facility] [phone]  MD Megan Morris, NP  Marcos Valladares, MD Jennifer Yu MD Lafayette Dowse, MD Pat Hammond MD   7253 [facility] [phone]  MD Jakob Ambrocio, FNP  Christie Singer, MD Jewel Bauer, MD Atiya Angeles, MD Peggy Kelley, MD [place] [facility] [phone]  Levell Labor, MD Yeimy Carter, MD Perri Love, MD Brody Mueller, MD Georgette Hale, MD   [phone]  MD Cat Angulo MD [place] [phone]  Sandy Khan, MD Janne Klinefelter, MD Lieutenant Zabala, MD   [facility] [phone]  Damaso Landin, MD Nithya Malik, MD Popeye Lantigua, MD Richi Mendiola, MD Claudio Bradshaw, MD Derrek Wakefield, NP  Niles Olivier MD 1619 [facility]   [phone]  MD Annelise Zamorano, MD Bard Duncan MD   2102 [facility] [phone]  [place] [address], MD Inés Pendleton, FNP  Yan Sanchez, PORSHA Sanchez, LUCITAP  PORSHA Hays, MD Lyubov Dorantes, MINESH Evangelista, DO Miscellaneous:  Christie Yost [phone]

## 2019-01-13 NOTE — ED PROVIDER NOTES
EMERGENCY DEPARTMENT HISTORY AND PHYSICAL EXAM 
 
 
Date: 1/13/2019 Patient Name: Victoria Hercules History of Presenting Illness Chief Complaint Patient presents with  Cough  
  pt reports cough x2 weeks, pain to left ribs with cough  Rib Pain History Provided By: Patient HPI: Victoria Hercules, 48 y.o. male with PMHx significant for migraines, sepsis, HTN, presents ambulatory to the ED with cc of moderate, productive cough that has been ongoing for the past 2 weeks. Pt states the cough is productive with yellow mucous. He states he has started to develop pain in his left flank with coughing and certain movements like turning. Pt reports he had a fever and a HA that started 2 weeks ago, that developed with the cough, that lasted 1 day before resolving. Pt states the cough remained despite resolution to his fever and headache. He states the cough is worse at night. Pt reports he has tried OTC meds for his cough that have given him no relief. He states he has no Hx asthma or COPD. Pt states he has had no recent travel or recent surgeries. Pt specifically denies rash, CP, SOB, numbness, weakness, tingling, LE swelling, extremity pain, nausea, and vomiting. There are no other complaints, changes, or physical findings at this time. PCP: None Current Outpatient Medications Medication Sig Dispense Refill  cefdinir (OMNICEF) 300 mg capsule Take 1 Cap by mouth two (2) times a day for 7 days. 14 Cap 0  
 azithromycin (ZITHROMAX Z-TYRELL) 250 mg tablet Take as directed on package label 6 Tab 0  
 guaiFENesin-codeine (ROBITUSSIN AC) 100-10 mg/5 mL solution Take 5 mL by mouth three (3) times daily as needed for Cough. Max Daily Amount: 15 mL. 60 mL 0  
 omeprazole (PRILOSEC OTC) 20 mg tablet Take 20 mg by mouth daily. 30 Tab 0 Past History Past Medical History: 
Past Medical History:  
Diagnosis Date  Gastrointestinal disorder  Hypertension  Neurological disorder migraines  Other ill-defined conditions(799.89) Sepsis Past Surgical History: 
Past Surgical History:  
Procedure Laterality Date  HX HEENT    
 teeth pulled  HX OTHER SURGICAL    
 cyst head removed  CT EGD TRANSORAL BIOPSY SINGLE/MULTIPLE  1/9/2014 Family History: 
History reviewed. No pertinent family history. Social History: 
Social History Tobacco Use  Smoking status: Current Every Day Smoker Packs/day: 0.50  Smokeless tobacco: Never Used Substance Use Topics  Alcohol use: No  
 Drug use: No  
 
 
Allergies: 
No Known Allergies Review of Systems Review of Systems Constitutional: Positive for fever (resolved). Negative for chills. HENT: Negative for congestion. Eyes: Negative for visual disturbance. Respiratory: Positive for cough (productive). Negative for chest tightness and shortness of breath. Cardiovascular: Negative for chest pain and leg swelling. Gastrointestinal: Negative for abdominal pain, diarrhea, nausea and vomiting. Endocrine: Negative for polyuria. Genitourinary: Positive for flank pain (left with coughing). Negative for dysuria, frequency and hematuria. Musculoskeletal: Negative for back pain and myalgias. Skin: Negative for color change. Allergic/Immunologic: Negative for immunocompromised state. Neurological: Positive for headaches (resolved). Negative for numbness. Physical Exam  
Physical Exam  
Nursing note and vitals reviewed. General appearance: non-toxic, NAD Eyes: PERRL, EOMI, conjunctiva normal, anicteric sclera HEENT: mucous membranes moist, oropharynx is clear Pulmonary:  Pt has a cough with FEV 1, scant wheezes and crackles at the L base Cardiac: normal rate and regular rhythm, no murmurs, gallops, or rubs, 2+DP pulses, 2+ radial pulses Abdomen: soft, nontender, nondistended MSK: no pre-tibial edema Neuro: Alert, answers questions appropriately Skin: capillary refill brisk Diagnostic Study Results Labs - No results found for this or any previous visit (from the past 12 hour(s)). Radiologic Studies -  
XR CHEST PA LAT Final Result Impression: No acute process in the chest. Left-sided rib abnormality. XR RIBS LT UNI 2 V Final Result Impression: No acute process in the chest. Left-sided rib abnormality. Narrative:  
 Study: XR RIBS LT UNI 2 V, XR CHEST PA LAT Indication:  Cough for 2 weeks. Pain in left ribs. Additional clinical history: 
 
Comparison: None. Findings: 
PA and lateral views of the chest were obtained. The lungs are clear. The heart 
is normal in size. There is no edema. No acute osseous abnormality is seen.  
 
 AP and oblique views of the left ribs were obtained. No fracture or other 
left-sided rib abnormality is seen. CXR Results  (Last 48 hours) 01/13/19 1536  XR CHEST PA LAT Final result Impression:  Impression: No acute process in the chest. Left-sided rib abnormality. Narrative:  Study: XR RIBS LT UNI 2 V, XR CHEST PA LAT Indication:  Cough for 2 weeks. Pain in left ribs. Additional clinical history:  
   
Comparison: None. Findings:  
PA and lateral views of the chest were obtained. The lungs are clear. The heart  
is normal in size. There is no edema. No acute osseous abnormality is seen. AP and oblique views of the left ribs were obtained. No fracture or other  
left-sided rib abnormality is seen. Medical Decision Making I am the first provider for this patient. I reviewed the vital signs, available nursing notes, past medical history, past surgical history, family history and social history. Vital Signs-Reviewed the patient's vital signs. Patient Vitals for the past 12 hrs: 
 Temp Pulse Resp BP SpO2  
01/13/19 1510 98.3 °F (36.8 °C) 79 16 (!) 162/99 98 % Pulse Oximetry Analysis - 98% on RA Cardiac Monitor: Rate: 79 bpm 
Rhythm: Normal Sinus Rhythm Records Reviewed: Nursing Notes, Old Medical Records, Previous Radiology Studies and Previous Laboratory Studies Provider Notes (Medical Decision Making): DDx: left lower lobe PNA, viral syndrome, bronchitis, low suspicion for PE, ACS or renal pathology. ED Course:  
Initial assessment performed. The patients presenting problems have been discussed, and they are in agreement with the care plan formulated and outlined with them. I have encouraged them to ask questions as they arise throughout their visit. Tobacco Counseling Discussed the risks of smoking and the benefits of smoking cessation as well as the long term sequelae of smoking with the patient. The patient verbalized their understanding. Counseled patient for approximately 3 - 5 minutes. Progress Note: 
   
 
Progress Note: 
4:16 PM 
Prior EKGs reviewed, and QTc less than 500. Medications - No data to display Critical Care Time:  
0 minutes Disposition: 
Discharge Note: 
4:13 PM 
The pt is ready for discharge. The pt's signs, symptoms, diagnosis, and discharge instructions have been discussed and pt has conveyed their understanding. The pt is to follow up as recommended or return to ER should their symptoms worsen. Plan has been discussed and pt is in agreement. PLAN: 
1. Discharge Medication List as of 1/13/2019  4:17 PM  
  
START taking these medications Details  
cefdinir (OMNICEF) 300 mg capsule Take 1 Cap by mouth two (2) times a day for 7 days. , Normal, Disp-14 Cap, R-0  
  
azithromycin (ZITHROMAX Z-TYRELL) 250 mg tablet Take as directed on package label, Normal, Disp-6 Tab, R-0  
  
guaiFENesin-codeine (ROBITUSSIN AC) 100-10 mg/5 mL solution Take 5 mL by mouth three (3) times daily as needed for Cough. Max Daily Amount: 15 mL. , Print, Disp-60 mL, R-0  
  
  
CONTINUE these medications which have NOT CHANGED Details omeprazole (PRILOSEC OTC) 20 mg tablet Take 20 mg by mouth daily. , Print, Disp-30 Tab, R-0  
  
  
 
2. Follow-up Information Follow up With Specialties Details Why Contact Info PRIMARY CARE DOCTOR  Schedule an appointment as soon as possible for a visit in 1 week  SEE ATTACHED LIST  
 [facility] EMERGENCY DEPT Emergency Medicine Go in 1 day If symptoms worsen [address] [facility] 
[phone] Return to ED if worse Diagnosis Clinical Impression: 1. Acute bronchitis, unspecified organism 2. Thoracic myofascial strain, initial encounter 3. Elevated blood pressure reading Attestations: This note is prepared by Gordo Becker, acting as Scribe for Frank Albrecht MD. Frank Albrecht MD: The scribe's documentation has been prepared under my direction and personally reviewed by me in its entirety. I confirm that the note above accurately reflects all work, treatment, procedures, and medical decision making performed by me. This note will not be viewable in [address]

## 2023-05-10 ENCOUNTER — HOSPITAL ENCOUNTER (EMERGENCY)
Facility: HOSPITAL | Age: 58
Discharge: HOME | End: 2023-05-10
Attending: EMERGENCY MEDICINE

## 2023-05-10 ENCOUNTER — APPOINTMENT (OUTPATIENT)
Facility: HOSPITAL | Age: 58
End: 2023-05-10

## 2023-05-10 VITALS
RESPIRATION RATE: 14 BRPM | DIASTOLIC BLOOD PRESSURE: 93 MMHG | OXYGEN SATURATION: 94 % | SYSTOLIC BLOOD PRESSURE: 125 MMHG | WEIGHT: 214.95 LBS | HEART RATE: 93 BPM | TEMPERATURE: 98.2 F

## 2023-05-10 DIAGNOSIS — R73.9 HYPERGLYCEMIA: Primary | ICD-10-CM

## 2023-05-10 DIAGNOSIS — N17.9 AKI (ACUTE KIDNEY INJURY) (HCC): ICD-10-CM

## 2023-05-10 LAB
ALBUMIN SERPL-MCNC: 3.7 G/DL (ref 3.5–5)
ALBUMIN/GLOB SERPL: 0.9 (ref 1.1–2.2)
ALP SERPL-CCNC: 91 U/L (ref 45–117)
ALT SERPL-CCNC: 42 U/L (ref 12–78)
ANION GAP BLD CALC-SCNC: 4 (ref 10–20)
ANION GAP SERPL CALC-SCNC: 8 MMOL/L (ref 5–15)
APPEARANCE UR: CLEAR
AST SERPL-CCNC: 27 U/L (ref 15–37)
BACTERIA URNS QL MICRO: NEGATIVE /HPF
BASE EXCESS BLD CALC-SCNC: 5.7 MMOL/L
BASOPHILS # BLD: 0.1 K/UL (ref 0–0.1)
BASOPHILS NFR BLD: 1 % (ref 0–1)
BILIRUB SERPL-MCNC: 0.8 MG/DL (ref 0.2–1)
BILIRUB UR QL: NEGATIVE
BUN SERPL-MCNC: 22 MG/DL (ref 6–20)
BUN/CREAT SERPL: 12 (ref 12–20)
CA-I BLD-MCNC: 1.14 MMOL/L (ref 1.12–1.32)
CALCIUM SERPL-MCNC: 8.3 MG/DL (ref 8.5–10.1)
CHLORIDE BLD-SCNC: 97 MMOL/L (ref 100–108)
CHLORIDE SERPL-SCNC: 95 MMOL/L (ref 97–108)
CO2 BLD-SCNC: 31 MMOL/L (ref 19–24)
CO2 SERPL-SCNC: 25 MMOL/L (ref 21–32)
COLOR UR: ABNORMAL
COMMENT:: NORMAL
CREAT SERPL-MCNC: 1.86 MG/DL (ref 0.7–1.3)
CREAT UR-MCNC: 1.5 MG/DL (ref 0.6–1.3)
DIFFERENTIAL METHOD BLD: NORMAL
EOSINOPHIL # BLD: 0.2 K/UL (ref 0–0.4)
EOSINOPHIL NFR BLD: 2 % (ref 0–7)
EPITH CASTS URNS QL MICRO: ABNORMAL /LPF
ERYTHROCYTE [DISTWIDTH] IN BLOOD BY AUTOMATED COUNT: 12 % (ref 11.5–14.5)
GLOBULIN SER CALC-MCNC: 3.9 G/DL (ref 2–4)
GLUCOSE BLD STRIP.AUTO-MCNC: 389 MG/DL (ref 74–106)
GLUCOSE BLD STRIP.AUTO-MCNC: 404 MG/DL (ref 65–117)
GLUCOSE SERPL-MCNC: 428 MG/DL (ref 65–100)
GLUCOSE UR STRIP.AUTO-MCNC: >1000 MG/DL
HCO3 BLDA-SCNC: 32 MMOL/L
HCT VFR BLD AUTO: 41.3 % (ref 36.6–50.3)
HGB BLD-MCNC: 14.2 G/DL (ref 12.1–17)
HGB UR QL STRIP: NEGATIVE
HYALINE CASTS URNS QL MICRO: ABNORMAL /LPF (ref 0–5)
IMM GRANULOCYTES # BLD AUTO: 0 K/UL (ref 0–0.04)
IMM GRANULOCYTES NFR BLD AUTO: 0 % (ref 0–0.5)
KETONES UR QL STRIP.AUTO: ABNORMAL MG/DL
LACTATE BLD-SCNC: 2.21 MMOL/L (ref 0.4–2)
LEUKOCYTE ESTERASE UR QL STRIP.AUTO: NEGATIVE
LYMPHOCYTES # BLD: 2.6 K/UL (ref 0.8–3.5)
LYMPHOCYTES NFR BLD: 30 % (ref 12–49)
MAGNESIUM SERPL-MCNC: 1.7 MG/DL (ref 1.6–2.4)
MCH RBC QN AUTO: 31.7 PG (ref 26–34)
MCHC RBC AUTO-ENTMCNC: 34.4 G/DL (ref 30–36.5)
MCV RBC AUTO: 92.2 FL (ref 80–99)
MONOCYTES # BLD: 0.6 K/UL (ref 0–1)
MONOCYTES NFR BLD: 6 % (ref 5–13)
NEUTS SEG # BLD: 5.3 K/UL (ref 1.8–8)
NEUTS SEG NFR BLD: 61 % (ref 32–75)
NITRITE UR QL STRIP.AUTO: NEGATIVE
NRBC # BLD: 0 K/UL (ref 0–0.01)
NRBC BLD-RTO: 0 PER 100 WBC
PCO2 BLDV: 48.8 MMHG (ref 41–51)
PH BLDV: 7.42 (ref 7.32–7.42)
PH UR STRIP: 5.5 (ref 5–8)
PLATELET # BLD AUTO: 232 K/UL (ref 150–400)
PMV BLD AUTO: 10.5 FL (ref 8.9–12.9)
PO2 BLDV: 67 MMHG (ref 25–40)
POTASSIUM BLD-SCNC: 3.8 MMOL/L (ref 3.5–5.5)
POTASSIUM SERPL-SCNC: 3.7 MMOL/L (ref 3.5–5.1)
PROT SERPL-MCNC: 7.6 G/DL (ref 6.4–8.2)
PROT UR STRIP-MCNC: 30 MG/DL
RBC # BLD AUTO: 4.48 M/UL (ref 4.1–5.7)
RBC #/AREA URNS HPF: ABNORMAL /HPF (ref 0–5)
SAO2 % BLD: 93 %
SERVICE CMNT-IMP: ABNORMAL
SERVICE CMNT-IMP: ABNORMAL
SODIUM BLD-SCNC: 132 MMOL/L (ref 136–145)
SODIUM SERPL-SCNC: 128 MMOL/L (ref 136–145)
SP GR UR REFRACTOMETRY: 1.02 (ref 1–1.03)
SPECIMEN HOLD: NORMAL
SPECIMEN SITE: ABNORMAL
UROBILINOGEN UR QL STRIP.AUTO: 1 EU/DL (ref 0.2–1)
WBC # BLD AUTO: 8.7 K/UL (ref 4.1–11.1)
WBC URNS QL MICRO: ABNORMAL /HPF (ref 0–4)
YEAST URNS QL MICRO: PRESENT

## 2023-05-10 PROCEDURE — 2580000003 HC RX 258

## 2023-05-10 PROCEDURE — 99285 EMERGENCY DEPT VISIT HI MDM: CPT

## 2023-05-10 PROCEDURE — 6370000000 HC RX 637 (ALT 250 FOR IP)

## 2023-05-10 PROCEDURE — 84295 ASSAY OF SERUM SODIUM: CPT

## 2023-05-10 PROCEDURE — 83735 ASSAY OF MAGNESIUM: CPT

## 2023-05-10 PROCEDURE — 6360000002 HC RX W HCPCS

## 2023-05-10 PROCEDURE — 93005 ELECTROCARDIOGRAM TRACING: CPT

## 2023-05-10 PROCEDURE — 82330 ASSAY OF CALCIUM: CPT

## 2023-05-10 PROCEDURE — 82947 ASSAY GLUCOSE BLOOD QUANT: CPT

## 2023-05-10 PROCEDURE — 80053 COMPREHEN METABOLIC PANEL: CPT

## 2023-05-10 PROCEDURE — 85025 COMPLETE CBC W/AUTO DIFF WBC: CPT

## 2023-05-10 PROCEDURE — 71046 X-RAY EXAM CHEST 2 VIEWS: CPT

## 2023-05-10 PROCEDURE — 96361 HYDRATE IV INFUSION ADD-ON: CPT

## 2023-05-10 PROCEDURE — 84132 ASSAY OF SERUM POTASSIUM: CPT

## 2023-05-10 PROCEDURE — 82803 BLOOD GASES ANY COMBINATION: CPT

## 2023-05-10 PROCEDURE — 36415 COLL VENOUS BLD VENIPUNCTURE: CPT

## 2023-05-10 PROCEDURE — 81001 URINALYSIS AUTO W/SCOPE: CPT

## 2023-05-10 PROCEDURE — 96365 THER/PROPH/DIAG IV INF INIT: CPT

## 2023-05-10 PROCEDURE — 82962 GLUCOSE BLOOD TEST: CPT

## 2023-05-10 RX ORDER — 0.9 % SODIUM CHLORIDE 0.9 %
1000 INTRAVENOUS SOLUTION INTRAVENOUS ONCE
Status: COMPLETED | OUTPATIENT
Start: 2023-05-10 | End: 2023-05-10

## 2023-05-10 RX ORDER — POTASSIUM CHLORIDE 7.45 MG/ML
10 INJECTION INTRAVENOUS
Status: DISPENSED | OUTPATIENT
Start: 2023-05-10 | End: 2023-05-10

## 2023-05-10 RX ADMIN — SODIUM CHLORIDE 1000 ML: 9 INJECTION, SOLUTION INTRAVENOUS at 16:31

## 2023-05-10 RX ADMIN — Medication 10 UNITS: at 18:33

## 2023-05-10 RX ADMIN — POTASSIUM CHLORIDE 10 MEQ: 7.46 INJECTION, SOLUTION INTRAVENOUS at 18:33

## 2023-05-10 ASSESSMENT — ENCOUNTER SYMPTOMS
NAUSEA: 0
SORE THROAT: 0
EYE PAIN: 0
EYE REDNESS: 0
DIARRHEA: 0
VOMITING: 0
ABDOMINAL PAIN: 0
SHORTNESS OF BREATH: 1
BACK PAIN: 0
COUGH: 1

## 2023-05-10 ASSESSMENT — PAIN SCALES - GENERAL: PAINLEVEL_OUTOF10: 0

## 2023-05-10 NOTE — DISCHARGE INSTRUCTIONS
You were found to have very high blood sugar and elevated kidney numbers which was likely due to dehydration. Your sugar did improve with fluids and insulin, but this will wear off quickly. It is very important that you stay hydrated, avoid sugary food/drinks, and follow up with your primary care doctor as soon as possible. Please return to the emergency department with any new or worsening symptoms.

## 2023-05-10 NOTE — ED PROVIDER NOTES
bpm, normal axis, no acute ischemic changes, no interval changes. [AK]   1940 BG after insulin 313. Patient states he is feeling much better, blurry vision improved. Offered admission given his LAURA and hyperglycemia. Patient has declined and would prefer to go home and follow up with his primary care. He understands the risks in leaving and the return precautions given. Answered questions and advised drinking plenty of water/staying away from high sugar food and drinks. [KM]      ED Course User Samantha Mijares MD  [KM] Jessica Shanks MD       CONSULTS:  None    PROCEDURES:  Unless otherwise noted below, none     Procedures      FINAL IMPRESSION      1. Hyperglycemia    2. LAURA (acute kidney injury) [facility])          DISPOSITION/PLAN   DISPOSITION Decision To Discharge 05/10/2023 07:42:19 PM      PATIENT REFERRED TO:  Britney Rawls MD  [address]  [phone]    Schedule an appointment as soon as possible for a visit       \[facility]\"" EMERGENCY DEPT  [address] [place] [address]  [address] [facility]  [phone]    As needed, If symptoms worsen      DISCHARGE MEDICATIONS:  There are no discharge medications for this patient. Controlled Substances Monitoring:     No flowsheet data found.     (Please note that portions of this note were completed with a voice recognition program.  Efforts were made to edit the dictations but occasionally words are mis-transcribed.)    Jessica Shanks MD (electronically signed)  Attending Emergency Physician           Jessica Shanks MD  Resident  05/10/23 4120

## 2023-05-11 LAB
EKG ATRIAL RATE: 83 BPM
EKG DIAGNOSIS: NORMAL
EKG P AXIS: 35 DEGREES
EKG P-R INTERVAL: 178 MS
EKG Q-T INTERVAL: 400 MS
EKG QRS DURATION: 110 MS
EKG QTC CALCULATION (BAZETT): 470 MS
EKG R AXIS: 32 DEGREES
EKG T AXIS: 30 DEGREES
EKG VENTRICULAR RATE: 83 BPM
GLUCOSE BLD STRIP.AUTO-MCNC: 313 MG/DL (ref 65–117)
SERVICE CMNT-IMP: ABNORMAL

## 2023-07-28 ENCOUNTER — HOSPITAL ENCOUNTER (EMERGENCY)
Facility: HOSPITAL | Age: 58
Discharge: HOME | End: 2023-07-28
Attending: EMERGENCY MEDICINE

## 2023-07-28 ENCOUNTER — APPOINTMENT (OUTPATIENT)
Facility: HOSPITAL | Age: 58
End: 2023-07-28

## 2023-07-28 VITALS
TEMPERATURE: 98.4 F | HEART RATE: 90 BPM | OXYGEN SATURATION: 97 % | RESPIRATION RATE: 16 BRPM | WEIGHT: 218.48 LBS | DIASTOLIC BLOOD PRESSURE: 92 MMHG | SYSTOLIC BLOOD PRESSURE: 121 MMHG

## 2023-07-28 DIAGNOSIS — J18.9 ATYPICAL PNEUMONIA: Primary | ICD-10-CM

## 2023-07-28 LAB
SARS-COV-2 RDRP RESP QL NAA+PROBE: NOT DETECTED
SOURCE: NORMAL

## 2023-07-28 PROCEDURE — 71045 X-RAY EXAM CHEST 1 VIEW: CPT

## 2023-07-28 PROCEDURE — 99284 EMERGENCY DEPT VISIT MOD MDM: CPT

## 2023-07-28 PROCEDURE — 87635 SARS-COV-2 COVID-19 AMP PRB: CPT

## 2023-07-28 RX ORDER — BENZONATATE 100 MG/1
100 CAPSULE ORAL 3 TIMES DAILY PRN
Qty: 30 CAPSULE | Refills: 0 | Status: SHIPPED | OUTPATIENT
Start: 2023-07-28 | End: 2023-08-07

## 2023-07-28 RX ORDER — HYDROCODONE BITARTRATE AND HOMATROPINE METHYLBROMIDE ORAL SOLUTION 5; 1.5 MG/5ML; MG/5ML
2.5 LIQUID ORAL EVERY 6 HOURS PRN
Qty: 60 ML | Refills: 0 | Status: SHIPPED | OUTPATIENT
Start: 2023-07-28 | End: 2023-08-03

## 2023-07-28 RX ORDER — AZITHROMYCIN 250 MG/1
TABLET, FILM COATED ORAL
Qty: 6 TABLET | Refills: 0 | Status: SHIPPED | OUTPATIENT
Start: 2023-07-28

## 2023-07-28 ASSESSMENT — PAIN SCALES - GENERAL: PAINLEVEL_OUTOF10: 0

## 2023-07-28 NOTE — DISCHARGE INSTRUCTIONS
Start antibiotic today. Tessalon I would schedule every 8 hours for the next 3 days and then go to every 8 hours as needed    Hycodan for bedtime use as this does contain a narcotic.

## 2023-07-28 NOTE — ED NOTES
DC papers reviewed and in hand, pt verbalized understanding. Patient ambulatory out of ED with steady gait, no acute distress noted.         Richard Jolley RN  07/28/23 6711

## 2023-08-23 ENCOUNTER — HOSPITAL ENCOUNTER (EMERGENCY)
Facility: HOSPITAL | Age: 58
Discharge: HOME | End: 2023-08-23
Attending: EMERGENCY MEDICINE

## 2023-08-23 VITALS
HEIGHT: 68 IN | TEMPERATURE: 98.5 F | RESPIRATION RATE: 18 BRPM | SYSTOLIC BLOOD PRESSURE: 142 MMHG | HEART RATE: 73 BPM | DIASTOLIC BLOOD PRESSURE: 98 MMHG | BODY MASS INDEX: 33.21 KG/M2 | OXYGEN SATURATION: 97 % | WEIGHT: 219.14 LBS

## 2023-08-23 DIAGNOSIS — L03.114 CELLULITIS OF LEFT UPPER EXTREMITY: Primary | ICD-10-CM

## 2023-08-23 PROCEDURE — 99283 EMERGENCY DEPT VISIT LOW MDM: CPT

## 2023-08-23 PROCEDURE — 6370000000 HC RX 637 (ALT 250 FOR IP): Performed by: EMERGENCY MEDICINE

## 2023-08-23 RX ORDER — CEPHALEXIN 250 MG/1
500 CAPSULE ORAL
Status: COMPLETED | OUTPATIENT
Start: 2023-08-23 | End: 2023-08-23

## 2023-08-23 RX ADMIN — MUPIROCIN: 20 OINTMENT TOPICAL at 02:27

## 2023-08-23 RX ADMIN — CEPHALEXIN 500 MG: 250 CAPSULE ORAL at 02:27

## 2023-08-23 ASSESSMENT — PAIN - FUNCTIONAL ASSESSMENT: PAIN_FUNCTIONAL_ASSESSMENT: 0-10

## 2023-08-23 ASSESSMENT — PAIN DESCRIPTION - ORIENTATION: ORIENTATION: LEFT

## 2023-08-23 ASSESSMENT — LIFESTYLE VARIABLES
HOW MANY STANDARD DRINKS CONTAINING ALCOHOL DO YOU HAVE ON A TYPICAL DAY: PATIENT DOES NOT DRINK
HOW OFTEN DO YOU HAVE A DRINK CONTAINING ALCOHOL: NEVER

## 2023-08-23 ASSESSMENT — PAIN SCALES - GENERAL
PAINLEVEL_OUTOF10: 7
PAINLEVEL_OUTOF10: 8

## 2023-08-23 ASSESSMENT — PAIN DESCRIPTION - LOCATION: LOCATION: SHOULDER

## 2023-08-23 NOTE — ED PROVIDER NOTES
EMERGENCY DEPARTMENT HISTORY AND PHYSICAL EXAM     ----------------------------------------------------------------------------  Please note that this dictation was completed with [employer], the [employer] (app) voice recognition software. Quite often unanticipated grammatical, syntax, homophones, and other interpretive errors are inadvertently transcribed by the computer software. Please disregard these errors. Please excuse any errors that have escaped final proofreading  ----------------------------------------------------------------------------      Date: 8/23/2023  Patient Name: Jose Luis Winkler     Chief Complaint   Patient presents with    Insect Bite     Patient arrives ambulatory to triage with complaint of possible bug bite on his left shoulder. Patient noticed a large bump on his shoulder last night. Patient reports that the lump is painful but denies any itching. Area noted to be red in triage. History obtainted from:  Patient    Other independent source of history: none    HPI: Carlos Alberto Sanchez is a 66 y.o. male, with significant pmhx of diabetes, who presents via private vehicle to the ED with c/o L shoulder redness and swelling. Patient's been without fever. Expressed concern for having a bug bite to this area. Notes it is not itchy but more uncomfortable. Denies associated fever, cough or associated injury      PCP: Avni Padilla MD    Allergy List:   No Known Allergies      Medications:  No current facility-administered medications for this encounter. Current Outpatient Medications   Medication Sig Dispense Refill    metFORMIN (GLUCOPHAGE) 1000 MG tablet Take 1 tablet by mouth 2 times daily (with meals)           PAST HISTORY       Past Medical History:  No past medical history on file. Past Surgical History:  No past surgical history on file. Family History:  No family history on file. Social History:        Allergies:  No Known Allergies    Records

## 2023-08-23 NOTE — DISCHARGE INSTRUCTIONS
It was a pleasure taking care of you in our Emergency Department today. We know that when you come to [facility], you are entrusting us with your health, comfort, and safety. Our physicians and nurses honor that trust, and truly appreciate the opportunity to care for you and your loved ones. We also value your feedback. If you receive a survey about your Emergency Department experience today, please fill it out. We care about our patients' feedback, and we listen to what you have to say. Thank you!       Dr. Servando Yung MD.

## 2023-08-24 RX ORDER — CEPHALEXIN 250 MG/1
250 CAPSULE ORAL 4 TIMES DAILY
Qty: 28 CAPSULE | Refills: 0 | Status: SHIPPED | OUTPATIENT
Start: 2023-08-24 | End: 2023-08-31

## 2023-08-24 RX ORDER — NAPROXEN 500 MG/1
500 TABLET ORAL 2 TIMES DAILY WITH MEALS
Qty: 60 TABLET | Refills: 0 | Status: SHIPPED | OUTPATIENT
Start: 2023-08-24

## 2023-08-24 ASSESSMENT — ENCOUNTER SYMPTOMS
DIARRHEA: 0
ABDOMINAL PAIN: 0
NAUSEA: 0
SHORTNESS OF BREATH: 0
VOMITING: 0